# Patient Record
Sex: MALE | Race: WHITE | Employment: OTHER | URBAN - METROPOLITAN AREA
[De-identification: names, ages, dates, MRNs, and addresses within clinical notes are randomized per-mention and may not be internally consistent; named-entity substitution may affect disease eponyms.]

---

## 2017-01-31 ENCOUNTER — TELEPHONE (OUTPATIENT)
Dept: MEDSURG UNIT | Age: 77
End: 2017-01-31

## 2017-01-31 NOTE — TELEPHONE ENCOUNTER
Pt's wife would like to have a  call back from you please. She wants to give you confidential information without pt knowing.  She can be reach a #t [phone]

## 2017-02-16 ENCOUNTER — OFFICE VISIT (OUTPATIENT)
Dept: NEUROLOGY | Age: 77
End: 2017-02-16

## 2017-02-16 VITALS
DIASTOLIC BLOOD PRESSURE: 90 MMHG | SYSTOLIC BLOOD PRESSURE: 132 MMHG | TEMPERATURE: 98 F | HEART RATE: 83 BPM | OXYGEN SATURATION: 95 % | BODY MASS INDEX: 34.54 KG/M2 | WEIGHT: 255 LBS | HEIGHT: 72 IN | RESPIRATION RATE: 16 BRPM

## 2017-02-16 DIAGNOSIS — G30.1 LATE ONSET ALZHEIMER'S DISEASE WITH BEHAVIORAL DISTURBANCE (HCC): Primary | ICD-10-CM

## 2017-02-16 DIAGNOSIS — F02.818 LATE ONSET ALZHEIMER'S DISEASE WITH BEHAVIORAL DISTURBANCE (HCC): Primary | ICD-10-CM

## 2017-02-16 RX ORDER — ESCITALOPRAM OXALATE 10 MG/1
10 TABLET ORAL DAILY
Qty: 30 TAB | Refills: 2 | Status: SHIPPED | OUTPATIENT
Start: 2017-02-16 | End: 2017-05-18 | Stop reason: SDUPTHER

## 2017-02-16 NOTE — MR AVS SNAPSHOT
Visit Information Date & Time Provider Department Dept. Phone Encounter #  
 2/16/2017  3:00 PM Rome Wolfe MD [facility]rology [facility] [phone] [id number] Follow-up Instructions Return in about 8 weeks (around 4/13/2017). Upcoming Health Maintenance Date Due DTaP/Tdap/Td series (1 - Tdap) 10/9/1960 ZOSTER VACCINE AGE 60> 10/9/1999 GLAUCOMA SCREENING Q2Y 10/9/2004 Pneumococcal 65+ Low/Medium Risk (1 of 2 - PCV13) 10/9/2004 MEDICARE YEARLY EXAM 10/9/2004 INFLUENZA AGE 9 TO ADULT 8/1/2016 Allergies as of 2/16/2017  Review Complete On: 2/16/2017 By: Rome Wolfe MD  
  
 Severity Noted Reaction Type Reactions Atenolol  01/06/2016    Unknown (comments) Dilaudid [Hydromorphone]  01/06/2016    Unknown (comments) Lisinopril  01/06/2016    Unknown (comments) Oxycodone  01/06/2016    Unknown (comments) Current Immunizations  Never Reviewed No immunizations on file. Not reviewed this visit Vitals BP Pulse Temp Resp Height(growth percentile) Weight(growth percentile) 132/90 83 98 °F (36.7 °C) 16 6' (1.829 m) 255 lb (115.7 kg) SpO2 BMI Smoking Status 95% 34.58 kg/m2 Former Smoker Vitals History BMI and BSA Data Body Mass Index Body Surface Area 34.58 kg/m 2 2.42 m 2 [facility] [address], [facility] [phone] Your Updated Medication List  
  
   
This list is accurate as of: 2/16/17  4:14 PM.  Always use your most recent med list.  
  
  
  
  
 allopurinol 300 mg tablet Commonly known as:  ZYLOPRIM  
  
 amLODIPine 5 mg tablet Commonly known as:  NORVASC  
  
 cholecalciferol 1,000 unit Cap Commonly known as:  VITAMIN D3 Take  by mouth daily. escitalopram oxalate 10 mg tablet Commonly known as:  Pollyann Hopkins Take 1 Tab by mouth daily. fexofenadine 180 mg tablet Commonly known as:  Eduardo Plenty Take  by mouth. glipiZIDE SR 5 mg CR tablet Commonly known as:  GLUCOTROL XL  
  
 hydroCHLOROthiazide 25 mg tablet Commonly known as:  HYDRODIURIL  
  
 imipramine 25 mg tablet Commonly known as:  TOFRANIL Take 25 mg by mouth three (3) times daily. memantine-donepezil 28-10 mg Cspx Commonly known as:  MOTION PI[employer]URE [facility] Take 1 Cap by mouth daily. metoprolol tartrate 25 mg tablet Commonly known as:  LOPRESSOR  
  
 polyethylene glycol 17 gram packet Commonly known as:  Anastacia Stacey Take 17 g by mouth daily. rivaroxaban 20 mg Tab tablet Commonly known as:  Edmonia Punch Take  by mouth daily. simvastatin 20 mg tablet Commonly known as:  ZOCOR SITagliptin-metFORMIN 50-1,000 mg per tablet Commonly known as:  Tamsen Marty Take 1 Tab by mouth two (2) times daily (with meals). traMADol 50 mg tablet Commonly known as:  ULTRAM  
  
 traMADol-acetaminophen 37.5-325 mg per tablet Commonly known as:  ULTRACET  
2 tabs bid Prescriptions Printed Refills  
 escitalopram oxalate (LEXAPRO) 10 mg tablet 2 Sig: Take 1 Tab by mouth daily. Class: Print Route: Oral  
  
Follow-up Instructions Return in about 8 weeks (around 4/13/2017). Introducing [facility] SERVICES! Shelby Chavez introduces Kii patient portal. Now you can access parts of your medical record, email your doctor's office, and request medication refills online. 1. In your internet browser, go to [email or web address] [email or web address] 2. Click on the First Time User? Click Here link in the Sign In box. You will see the New Member Sign Up page. 3. Enter your Kii Access Code exactly as it appears below. You will not need to use this code after youve completed the sign-up process. If you do not sign up before the expiration date, you must request a new code. · Kii Access Code: [email or web address] Expires: 5/17/2017  3:02 PM 
 
 4. Enter the last four digits of your Social Security Number (xxxx) and Date of Birth (mm/dd/yyyy) as indicated and click Submit. You will be taken to the next sign-up page. 5. Create a FeedMagnet ID. This will be your FeedMagnet login ID and cannot be changed, so think of one that is secure and easy to remember. 6. Create a FeedMagnet password. You can change your password at any time. 7. Enter your Password Reset Question and Answer. This can be used at a later time if you forget your password. 8. Enter your e-mail address. You will receive e-mail notification when new information is available in [address] 9. Click Sign Up. You can now view and download portions of your medical record. 10. Click the Download Summary menu link to download a portable copy of your medical information. If you have questions, please visit the Frequently Asked Questions section of the FeedMagnet website. Remember, FeedMagnet is NOT to be used for urgent needs. For medical emergencies, dial 911. Now available from your iPhone and Android! Please provide this summary of care documentation to your next provider. Your primary care clinician is listed as Greg Bustillos. If you have any questions after today's visit, please call [phone].

## 2017-02-16 NOTE — PROGRESS NOTES
[address] [facility]   [place] [address]   [place], [address]   [phone] [facility]   [phone] Fax               Chief Complaint   Patient presents with    Memory Loss     follow up     Current Outpatient Prescriptions   Medication Sig Dispense Refill    memantine-donepezil (NAMZARIC) 28-10 mg CSpX Take 1 Cap by mouth daily. 30 Cap 1    allopurinol (ZYLOPRIM) 300 mg tablet   0    amLODIPine (NORVASC) 5 mg tablet   0    glipiZIDE SR (GLUCOTROL) 5 mg CR tablet   0    hydrochlorothiazide (HYDRODIURIL) 25 mg tablet   0    metoprolol (LOPRESSOR) 25 mg tablet   0    simvastatin (ZOCOR) 20 mg tablet   0    traMADol-acetaminophen (ULTRACET) 37.5-325 mg per tablet 2 tabs bid  0    traMADol (ULTRAM) 50 mg tablet   0    fexofenadine (ALLEGRA) 180 mg tablet Take  by mouth.  imipramine (TOFRANIL) 25 mg tablet Take 25 mg by mouth three (3) times daily.  sitaGLIPtin-metFORMIN (JANUMET) 50-1,000 mg per tablet Take 1 Tab by mouth two (2) times daily (with meals).  polyethylene glycol (MIRALAX) 17 gram packet Take 17 g by mouth daily.  rivaroxaban (XARELTO) 20 mg tab tablet Take  by mouth daily.  Cholecalciferol, Vitamin D3, 1,000 unit cap Take  by mouth daily. Allergies   Allergen Reactions    Atenolol Unknown (comments)    Dilaudid [Hydromorphone] Unknown (comments)    Lisinopril Unknown (comments)    Oxycodone Unknown (comments)     Social History   Substance Use Topics    Smoking status: Former Smoker    Smokeless tobacco: None    Alcohol use Yes     Patient returns today for follow-up dementia, Alzheimer's type. His wife accompanies him and provides history. She has a typewritten list of issues and most of these relate to behavior. She notes that he argues about the water filter needs to be replaced but it is too expensive. 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   He was not able to handle his responsibilities in terms of leading a group at [employer]. He is sending money office organizations and has overpaid conference fees because he did not understand. He is cursing at times. He is forgetting to check the port slide and if the doors locked. He has become loud and argumentative. Examination  Visit Vitals    /90    Pulse 83    Temp 98 °F (36.7 °C)    Resp 16    Ht 6' (1.829 m)    Wt 115.7 kg (255 lb)    SpO2 95%    BMI 34.58 kg/m2     He is awake alert and oriented. He is quick to anger in the room. He has no icterus. He has no edema. He is awake alert oriented to the correct day and date. Tells me the current president, the past president, and who lost the election as well. Correctly calculates there are 27 nickels in $1.35. He has registration 3/3 and recall 1/3 at 5 minutes even with hints. No pronation or drift. No nystagmus. Steady gait. Impression/Plan  Dementia, Alzheimer's type with agitation. Continue Namzaric. We discussed options today. I had really like to avoid a neuroleptic so at this point we will try a small dose of Lexapro 10 mg daily. We will see him back in 8 weeks and if this is having a positive effect we will continue it. If it is having a positive effect but not complete effect then we will increase it. If it has no effective then we will likely try Risperdal.        This note was created using voice recognition software. Despite editing, there may be syntax errors. This note will not be viewable in [address]     Total time: 30 min   Counseling / coordination time: 20 min   > 50% counseling / coordination?: Yes re: as documented above

## 2017-02-16 NOTE — PROGRESS NOTES
Follow up for memory loss. Spouse reports spouse has become very negative,screams when he doesn't understand things. Has become argumentative. Patient aware of these behaviors. Reports strange dreams and increase in confusion at times.

## 2017-05-08 ENCOUNTER — TELEPHONE (OUTPATIENT)
Dept: NEUROLOGY | Age: 77
End: 2017-05-08

## 2017-05-08 NOTE — TELEPHONE ENCOUNTER
----- Message from Florence Doherty sent at 5/8/2017 10:37 AM EDT -----  Regarding: Dr. Lsia Rosas  Pt stated he is looking into getting a walker he can push and sit down on if he needs to, and would like to know if the doctor could write a Rx for him. Best contact number [phone].

## 2017-05-18 ENCOUNTER — OFFICE VISIT (OUTPATIENT)
Dept: NEUROLOGY | Age: 77
End: 2017-05-18

## 2017-05-18 VITALS
HEIGHT: 72 IN | SYSTOLIC BLOOD PRESSURE: 110 MMHG | WEIGHT: 259 LBS | TEMPERATURE: 98.1 F | HEART RATE: 72 BPM | BODY MASS INDEX: 35.08 KG/M2 | DIASTOLIC BLOOD PRESSURE: 76 MMHG | OXYGEN SATURATION: 95 % | RESPIRATION RATE: 15 BRPM

## 2017-05-18 DIAGNOSIS — F02.818 LATE ONSET ALZHEIMER'S DISEASE WITH BEHAVIORAL DISTURBANCE (HCC): Primary | ICD-10-CM

## 2017-05-18 DIAGNOSIS — G30.1 LATE ONSET ALZHEIMER'S DISEASE WITH BEHAVIORAL DISTURBANCE (HCC): Primary | ICD-10-CM

## 2017-05-18 RX ORDER — ESCITALOPRAM OXALATE 20 MG/1
20 TABLET ORAL DAILY
Qty: 30 TAB | Refills: 2 | Status: SHIPPED | OUTPATIENT
Start: 2017-05-18 | End: 2017-08-14 | Stop reason: SDUPTHER

## 2017-05-18 NOTE — PROGRESS NOTES
[address] [facility]. [address]   [place] [address]   [place]ann Wilson    [facility]   [phone] Fax               Chief Complaint   Patient presents with    Memory Loss     follow up     Current Outpatient Prescriptions   Medication Sig Dispense Refill    escitalopram oxalate (LEXAPRO) 10 mg tablet Take 1 Tab by mouth daily. 30 Tab 2    memantine-donepezil (NAMZARIC) 28-10 mg CSpX Take 1 Cap by mouth daily. 30 Cap 1    allopurinol (ZYLOPRIM) 300 mg tablet   0    amLODIPine (NORVASC) 5 mg tablet   0    glipiZIDE SR (GLUCOTROL) 5 mg CR tablet   0    hydrochlorothiazide (HYDRODIURIL) 25 mg tablet   0    metoprolol (LOPRESSOR) 25 mg tablet   0    simvastatin (ZOCOR) 20 mg tablet   0    traMADol-acetaminophen (ULTRACET) 37.5-325 mg per tablet 2 tabs bid  0    traMADol (ULTRAM) 50 mg tablet   0    fexofenadine (ALLEGRA) 180 mg tablet Take  by mouth.  imipramine (TOFRANIL) 25 mg tablet Take 25 mg by mouth three (3) times daily.  sitaGLIPtin-metFORMIN (JANUMET) 50-1,000 mg per tablet Take 1 Tab by mouth two (2) times daily (with meals).  polyethylene glycol (MIRALAX) 17 gram packet Take 17 g by mouth daily.  rivaroxaban (XARELTO) 20 mg tab tablet Take  by mouth daily.  Cholecalciferol, Vitamin D3, 1,000 unit cap Take  by mouth daily. Allergies   Allergen Reactions    Atenolol Unknown (comments)    Dilaudid [Hydromorphone] Unknown (comments)    Lisinopril Unknown (comments)    Oxycodone Unknown (comments)     Social History   Substance Use Topics    Smoking status: Former Smoker    Smokeless tobacco: None    Alcohol use Yes     Patient returns today for follow-up Alzheimer's dementia with agitation. Last visit we continued his memory modifying medications however we also added some Lexapro to see if that helped with the agitation. Since then he thinks his agitation has been better.   He think his mood has been a bit more stable. His wife has noticed some improvement as well. She still has to redirect at times. He is tolerating medicine without difficulty. No aggression although she does say that he will get frustrated still very easily and swear. They just got back from a cruise to [place]. Examination  Visit Vitals    Resp 15    Ht 6' (1.829 m)    Wt 117.5 kg (259 lb)    BMI 35.13 kg/m2     He looks well. Probably dressed and groomed. Discusses the cruise to [place]. Discusses current events per    Impression/Plan  Agitation and dementia as stated. Continue with our memory modifying medications as is. Increase Lexapro to 20 mg daily. Discussed potential side effects, potential benefits, and potential alternatives. Would like to avoid neuroleptics. Follow in 2 months and see how is doing on this higher dose. Also discussed redirection and picking battles. This note was created using voice recognition software. Despite editing, there may be syntax errors. This note will not be viewable in [address]

## 2017-05-18 NOTE — PROGRESS NOTES
Follow up for memory loss. No significant changes in memory since last visit. Spouse and patient reports increase in unsteady gait and increase in hand tremors. Spouse reports diarrhea and increase in fatigue since starting namzaric.

## 2017-05-18 NOTE — PATIENT INSTRUCTIONS
Information Regarding Testing     If you have physican order for a test or a medication denied by your insurance company, this does not mean the test or medication is not appropriate for you as that is a medical decision, not a decision to be made by an insurance company representative or by an [facility] physician who has not interviewed and examined you. This is a decision to be made between you and your physician. The denial of services is a contractual matter between you and your insurance company, not an issue between your physician and the insurance company. If your test or medication is denied, you can take the following steps to help resolve the issue:    1. File a complaint with the [facility] regarding your insurance company's denial of services ordered for you. You can do this either by calling them directly or by completing an on-line complaint form on the [employer]. This can be found at www.Node1    2. Also file a formal complaint with your insurance company and ask to have the name of the person denying the service so that you may explore a legal option should you be harmed by this denial of service. Again, the fact the insurance company will not pay for the service does not mean it is not medically necessary and I would encourage you to follow through with the plan that was made with your physician    3. File a written complaint with your employer so your employer and benefit manager is aware of the poor coverage they are providing their employees. If you have medicare/medicaid, complain to your representative in the House and to your Lu Aguilera.         [facility]   Statement to Patients  April 1, 2014      In an effort to ensure the large volume of patient prescription refills is processed in the most efficient and expeditious manner, we are asking our patients to assist us by calling your Pharmacy for all prescription refills, this will include also your  Mail Order Pharmacy. The pharmacy will contact our office electronically to continue the refill process. Please do not wait until the last minute to call your pharmacy. We need at least 48 hours (2days) to fill prescriptions. We also encourage you to call your pharmacy before going to  your prescription to make sure it is ready. With regard to controlled substance prescription refill requests (narcotic refills) that need to be picked up at our office, we ask your cooperation by providing us with at least 72 hours (3days) notice that you will need a refill. We will not refill narcotic prescription refill requests after 4:00pm on any weekday, Monday through Thursday, or after 2:00pm on Fridays, or on the weekends. We encourage everyone to explore another way of getting your prescription refill request processed using [employer], our patient web portal through our electronic medical record system. [employer] is an efficient and effective way to communicate your medication request directly to the office and  downloadable as an francisco on your smart phone . [employer] also features a review functionality that allows you to view your medication list as well as leave messages for your physician. Are you ready to get connected? If so please review the attatched instructions or speak to any of our staff to get you set up right away! Thank you so much for your cooperation. Should you have any questions please contact our Practice Administrator. The Physicians and Staff,  [facility]       If we have ordered testing for you, we do not call patients with results and we do not give test results over the phone. We schedule follow up appointments so that your results can be discussed in person and any questions you have regarding them may be addressed.   If something of concern is revealed on your test, we will call you for a sooner follow up appointment. Additionally, results may be found by using the My Chart feature and one of our patient service representatives at the  can give you instructions on how to access this feature of our electronic medical record system. Learning About Moises Ceja  What is a living will? A living will is a legal form you use to write down the kind of care you want at the end of your life. It is used by the health professionals who will treat you if you aren't able to decide for yourself. If you put your wishes in writing, your loved ones and others will know what kind of care you want. They won't need to guess. This can ease your mind and be helpful to others. A living will is not the same as an estate or property will. An estate will explains what you want to happen with your money and property after you die. Is a living will a legal document? A living will is a legal document. Each state has its own laws about living ordaz. If you move to another state, make sure that your living will is legal in the state where you now live. Or you might use a universal form that has been approved by many states. This kind of form can sometimes be completed and stored online. Your electronic copy will then be available wherever you have a connection to the Internet. In most cases, doctors will respect your wishes even if you have a form from a different state. · You don't need an  to complete a living will. But legal advice can be helpful if your state's laws are unclear, your health history is complicated, or your family can't agree on what should be in your living will. · You can change your living will at any time. Some people find that their wishes about end-of-life care change as their health changes. · In addition to making a living will, think about completing a medical power of  form.  This form lets you name the person you want to make end-of-life treatment decisions for you (your \"health care agent\") if you're not able to. Many hospitals and nursing homes will give you the forms you need to complete a living will and a medical power of . · Your living will is used only if you can't make or communicate decisions for yourself anymore. If you become able to make decisions again, you can accept or refuse any treatment, no matter what you wrote in your living will. · Your state may offer an online registry. This is a place where you can store your living will online so the doctors and nurses who need to treat you can find it right away. What should you think about when creating a living will? Talk about your end-of-life wishes with your family members and your doctor. Let them know what you want. That way the people making decisions for you won't be surprised by your choices. Think about these questions as you make your living will:  · Do you know enough about life support methods that might be used? If not, talk to your doctor so you know what might be done if you can't breathe on your own, your heart stops, or you're unable to swallow. · What things would you still want to be able to do after you receive life-support methods? Would you want to be able to walk? To speak? To eat on your own? To live without the help of machines? · If you have a choice, where do you want to be cared for? In your home? At a hospital or nursing home? · Do you want certain Zoroastrianism practices performed if you become very ill? · If you have a choice at the end of your life, where would you prefer to die? At home? In a hospital or nursing home? Somewhere else? · Would you prefer to be buried or cremated? · Do you want your organs to be donated after you die? What should you do with your living will? · Make sure that your family members and your health care agent have copies of your living will. · Give your doctor a copy of your living will to keep in your medical record.  If you have more than one doctor, make sure that each one has a copy. · You may want to put a copy of your living will where it can be easily found. Where can you learn more? Go to http://saira-gogo.info/. Enter K039 in the search box to learn more about \"Learning About Living Miguel Su. \"  Current as of: February 24, 2016  Content Version: 11.2  © 1290-4136 [employer]. Care instructions adapted under license by [employer] (which disclaims liability or warranty for this information). If you have questions about a medical condition or this instruction, always ask your healthcare professional. [address] any warranty or liability for your use of this information.

## 2017-05-18 NOTE — MR AVS SNAPSHOT
Visit Information Date & Time Provider Department Dept. Phone Encounter #  
 5/18/2017  3:20 PM Saadia Luo MD Neurology [phone] Follow-up Instructions Return in about 2 weeks (around 6/1/2017). Upcoming Health Maintenance Date Due DTaP/Tdap/Td series (1 - Tdap) 10/9/1960 ZOSTER VACCINE AGE 60> 10/9/1999 GLAUCOMA SCREENING Q2Y 10/9/2004 Pneumococcal 65+ Low/Medium Risk (1 of 2 - PCV13) 10/9/2004 MEDICARE YEARLY EXAM 10/9/2004 INFLUENZA AGE 9 TO ADULT 8/1/2017 Allergies as of 5/18/2017  Review Complete On: 5/18/2017 By: Saadia Luo MD  
  
 Severity Noted Reaction Type Reactions Atenolol  01/06/2016    Unknown (comments) Dilaudid [Hydromorphone]  01/06/2016    Unknown (comments) Lisinopril  01/06/2016    Unknown (comments) Oxycodone  01/06/2016    Unknown (comments) Current Immunizations  Never Reviewed No immunizations on file. Not reviewed this visit Vitals BP Pulse Temp Resp Height(growth percentile) Weight(growth percentile) 110/76 72 98.1 °F (36.7 °C) 15 6' (1.829 m) 259 lb (117.5 kg) SpO2 BMI Smoking Status 95% 35.13 kg/m2 Former Smoker Vitals History BMI and BSA Data Body Mass Index Body Surface Area  
 35.13 kg/m 2 2.44 m 2 Preferred Pharmacy Pharmacy Name Phone [address] [place] [address] [phone] Your Updated Medication List  
  
   
This list is accurate as of: 5/18/17  3:37 PM.  Always use your most recent med list.  
  
  
  
  
 allopurinol 300 mg tablet Commonly known as:  ZYLOPRIM  
  
 amLODIPine 5 mg tablet Commonly known as:  NORVASC  
  
 cholecalciferol 1,000 unit Cap Commonly known as:  VITAMIN D3 Take  by mouth daily. escitalopram oxalate 20 mg tablet Commonly known as:  Vernestine Fiddler Take 1 Tab by mouth daily. fexofenadine 180 mg tablet Commonly known as:  Ania Coats Take  by mouth. glipiZIDE SR 5 mg CR tablet Commonly known as:  GLUCOTROL XL  
  
 hydroCHLOROthiazide 25 mg tablet Commonly known as:  HYDRODIURIL  
  
 imipramine 25 mg tablet Commonly known as:  TOFRANIL Take 25 mg by mouth three (3) times daily. memantine-donepezil 28-10 mg Cspx Commonly known as:  MOTION [facility] Take 1 Cap by mouth daily. metoprolol tartrate 25 mg tablet Commonly known as:  LOPRESSOR  
  
 polyethylene glycol 17 gram packet Commonly known as:  Izabella [place] Take 17 g by mouth daily. rivaroxaban 20 mg Tab tablet Commonly known as:  Sanford Kylah Take  by mouth daily. simvastatin 20 mg tablet Commonly known as:  ZOCOR SITagliptin-metFORMIN 50-1,000 mg per tablet Commonly known as:  Pooja Spray Take 1 Tab by mouth two (2) times daily (with meals). traMADol 50 mg tablet Commonly known as:  ULTRAM  
  
 traMADol-acetaminophen 37.5-325 mg per tablet Commonly known as:  ULTRACET  
2 tabs bid Prescriptions Printed Refills  
 escitalopram oxalate (LEXAPRO) 20 mg tablet 2 Sig: Take 1 Tab by mouth daily. Class: Print Route: Oral  
  
Follow-up Instructions Return in about 2 weeks (around 6/1/2017). Patient Instructions Information Regarding Testing If you have physican order for a test or a medication denied by your insurance company, this does not mean the test or medication is not appropriate for you as that is a medical decision, not a decision to be made by an insurance company representative or by an [facility] physician who has not interviewed and examined you. This is a decision to be made between you and your physician. The denial of services is a contractual matter between you and your insurance company, not an issue between your physician and the insurance company. If your test or medication is denied, you can take the following steps to help resolve the issue: 1.  File a complaint with the [facility] of Insurance regarding your insurance company's denial of services ordered for you. You can do this either by calling them directly or by completing an on-line complaint form on the [employer]. This can be found at [email or web address].[email or web address] 2. Also file a formal complaint with your insurance company and ask to have the name of the person denying the service so that you may explore a legal option should you be harmed by this denial of service. Again, the fact the insurance company will not pay for the service does not mean it is not medically necessary and I would encourage you to follow through with the plan that was made with your physician 3. File a written complaint with your employer so your employer and benefit manager is aware of the poor coverage they are providing their employees. If you have medicare/medicaid, complain to your representative in the House and to your Lu Aguilera. PRESCRIPTION REFILL POLICY [facility] Statement to Patients April 1, 2014 In an effort to ensure the large volume of patient prescription refills is processed in the most efficient and expeditious manner, we are asking our patients to assist us by calling your Pharmacy for all prescription refills, this will include also your  Mail Order Pharmacy. The pharmacy will contact our office electronically to continue the refill process. Please do not wait until the last minute to call your pharmacy. We need at least 48 hours (2days) to fill prescriptions. We also encourage you to call your pharmacy before going to  your prescription to make sure it is ready. With regard to controlled substance prescription refill requests (narcotic refills) that need to be picked up at our office, we ask your cooperation by providing us with at least 72 hours (3days) notice that you will need a refill. We will not refill narcotic prescription refill requests after 4:00pm on any weekday, Monday through Thursday, or after 2:00pm on Fridays, or on the weekends. We encourage everyone to explore another way of getting your prescription refill request processed using Eyestorm, our patient web portal through our electronic medical record system. Eyestorm is an efficient and effective way to communicate your medication request directly to the office and  downloadable as an francisco on your smart phone . Eyestorm also features a review functionality that allows you to view your medication list as well as leave messages for your physician. Are you ready to get connected? If so please review the attatched instructions or speak to any of our staff to get you set up right away! Thank you so much for your cooperation. Should you have any questions please contact our Practice Administrator. The Physicians and Staff,  [facility] If we have ordered testing for you, we do not call patients with results and we do not give test results over the phone. We schedule follow up appointments so that your results can be discussed in person and any questions you have regarding them may be addressed. If something of concern is revealed on your test, we will call you for a sooner follow up appointment. Additionally, results may be found by using the My Chart feature and one of our patient service representatives at the  can give you instructions on how to access this feature of our electronic medical record system. Sarah Welsh 1721 What is a living will? A living will is a legal form you use to write down the kind of care you want at the end of your life. It is used by the health professionals who will treat you if you aren't able to decide for yourself.  
If you put your wishes in writing, your loved ones and others will know what kind of care you want. They won't need to guess. This can ease your mind and be helpful to others. A living will is not the same as an estate or property will. An estate will explains what you want to happen with your money and property after you die. Is a living will a legal document? A living will is a legal document. Each state has its own laws about living ordaz. If you move to another state, make sure that your living will is legal in the state where you now live. Or you might use a universal form that has been approved by many states. This kind of form can sometimes be completed and stored online. Your electronic copy will then be available wherever you have a connection to the Internet. In most cases, doctors will respect your wishes even if you have a form from a different state. · You don't need an  to complete a living will. But legal advice can be helpful if your state's laws are unclear, your health history is complicated, or your family can't agree on what should be in your living will. · You can change your living will at any time. Some people find that their wishes about end-of-life care change as their health changes. · In addition to making a living will, think about completing a medical power of  form. This form lets you name the person you want to make end-of-life treatment decisions for you (your \"health care agent\") if you're not able to. Many hospitals and nursing homes will give you the forms you need to complete a living will and a medical power of . · Your living will is used only if you can't make or communicate decisions for yourself anymore. If you become able to make decisions again, you can accept or refuse any treatment, no matter what you wrote in your living will. · Your state may offer an online registry. This is a place where you can store your living will online so the doctors and nurses who need to treat you can find it right away. What should you think about when creating a living will? Talk about your end-of-life wishes with your family members and your doctor. Let them know what you want. That way the people making decisions for you won't be surprised by your choices. Think about these questions as you make your living will: · Do you know enough about life support methods that might be used? If not, talk to your doctor so you know what might be done if you can't breathe on your own, your heart stops, or you're unable to swallow. · What things would you still want to be able to do after you receive life-support methods? Would you want to be able to walk? To speak? To eat on your own? To live without the help of machines? · If you have a choice, where do you want to be cared for? In your home? At a hospital or nursing home? · Do you want certain Pent[employer] practices performed if you become very ill? · If you have a choice at the end of your life, where would you prefer to die? At home? In a hospital or nursing home? Somewhere else? · Would you prefer to be buried or cremated? · Do you want your organs to be donated after you die? What should you do with your living will? · Make sure that your family members and your health care agent have copies of your living will. · Give your doctor a copy of your living will to keep in your medical record. If you have more than one doctor, make sure that each one has a copy. · You may want to put a copy of your living will where it can be easily found. Where can you learn more? Go to http://saira-gogo.info/. Enter U863 in the search box to learn more about \"Learning About Living Silvia Buckley. \" Current as of: February 24, 2016 Content Version: 11.2 © 4214-7103 [employer]. Care instructions adapted under license by [employer] (which disclaims liability or warranty for this information).  If you have questions about a medical condition or this instruction, always ask your healthcare professional. [place] [address] any warranty or liability for your use of this information. Introducing [facility]! Chyna Jimenez introduces dotSyntax patient portal. Now you can access parts of your medical record, email your doctor's office, and request medication refills online. 1. In your internet browser, go to [email or web address] 2. Click on the First Time User? Click Here link in the Sign In box. You will see the New Member Sign Up page. 3. Enter your dotSyntax Access Code exactly as it appears below. You will not need to use this code after youve completed the sign-up process. If you do not sign up before the expiration date, you must request a new code. · dotSyntax Access Code: [email or web address] Expires: 8/16/2017  3:34 PM 
 
4. Enter the last four digits of your Social Security Number (xxxx) and Date of Birth (mm/dd/yyyy) as indicated and click Submit. You will be taken to the next sign-up page. 5. Create a dotSyntax ID. This will be your dotSyntax login ID and cannot be changed, so think of one that is secure and easy to remember. 6. Create a dotSyntax password. You can change your password at any time. 7. Enter your Password Reset Question and Answer. This can be used at a later time if you forget your password. 8. Enter your e-mail address. You will receive e-mail notification when new information is available in [address] 9. Click Sign Up. You can now view and download portions of your medical record. 10. Click the Download Summary menu link to download a portable copy of your medical information. If you have questions, please visit the Frequently Asked Questions section of the dotSyntax website. Remember, dotSyntax is NOT to be used for urgent needs. For medical emergencies, dial 911. Now available from your iPhone and Android! Please provide this summary of care documentation to your next provider. Your primary care clinician is listed as [address]. If you have any questions after today's visit, please call [phone].

## 2017-08-11 NOTE — PERIOP NOTES
[address]  Endoscopy Preprocedure Instructions      1. On the day of your surgery, please report to registration located on the 2nd floor of the  [facility]. yes    2. You must have a responsible adult to drive you to the hospital, stay at the hospital during your procedure and drive you home. If they leave your procedure will not be started (no exceptions). yes    3. Do not have anything to eat or drink (including water, gum, mints, coffee, and juice) after midnight. This does not apply to the medications you were instructed to take by your physician. yesIf you are currently taking Plavix, Coumadin, Aspirin, or other blood-thinning agents, contact your physician for special instructions. Yes,Xeralto. 4. If you are having a procedure that requires bowel prep: We recommend that you have only clear liquids the day before your procedure and begin your bowel prep by 5:00 pm.  You may continue to drink clear liquids until midnight. If for any reason you are not able to complete your prep please notify your physician immediately. yes    5. Have a list of all current medications, including vitamins, herbal supplements and any other over the counter medications. yes    6. If you wear glasses, contacts, dentures and/or hearing aids, they may be removed prior to procedure, please bring a case to store them in. Yes. Hearing aids    7. You should understand that if you do not follow these instructions your procedure may be cancelled. If your physical condition changes (I.e. fever, cold or flu) please contact your doctor as soon as possible. 8. It is important that you be on time.   If for any reason you are unable to keep your appointment please call )- the day of or your physicians office prior to your scheduled procedure

## 2017-08-14 ENCOUNTER — OFFICE VISIT (OUTPATIENT)
Dept: NEUROLOGY | Age: 77
End: 2017-08-14

## 2017-08-14 VITALS
WEIGHT: 261 LBS | HEART RATE: 59 BPM | TEMPERATURE: 98 F | RESPIRATION RATE: 18 BRPM | OXYGEN SATURATION: 96 % | BODY MASS INDEX: 35.35 KG/M2 | DIASTOLIC BLOOD PRESSURE: 90 MMHG | SYSTOLIC BLOOD PRESSURE: 128 MMHG | HEIGHT: 72 IN

## 2017-08-14 DIAGNOSIS — R26.9 GAIT ABNORMALITY: Primary | ICD-10-CM

## 2017-08-14 DIAGNOSIS — G30.1 LATE ONSET ALZHEIMER'S DISEASE WITH BEHAVIORAL DISTURBANCE (HCC): ICD-10-CM

## 2017-08-14 DIAGNOSIS — F02.818 LATE ONSET ALZHEIMER'S DISEASE WITH BEHAVIORAL DISTURBANCE (HCC): ICD-10-CM

## 2017-08-14 RX ORDER — RISPERIDONE 0.5 MG/1
0.5 TABLET, FILM COATED ORAL
Qty: 30 TAB | Refills: 3 | Status: SHIPPED | OUTPATIENT
Start: 2017-08-14 | End: 2017-10-17 | Stop reason: SDUPTHER

## 2017-08-14 RX ORDER — ESCITALOPRAM OXALATE 20 MG/1
20 TABLET ORAL DAILY
Qty: 90 TAB | Refills: 3 | Status: SHIPPED | OUTPATIENT
Start: 2017-08-14 | End: 2017-10-17

## 2017-08-14 NOTE — PROGRESS NOTES
Follow up for memory loss. Spouse reports decline in short term since last visit. Spouse has letter with multiple complaints. See media for letter.

## 2017-08-14 NOTE — MR AVS SNAPSHOT
Visit Information Date & Time Provider Department Dept. Phone Encounter #  
 8/14/2017  3:20 PM Ramesh Tobin MD [facility] [phone] [id number] Follow-up Instructions Return in about 8 weeks (around 10/9/2017). Upcoming Health Maintenance Date Due DTaP/Tdap/Td series (1 - Tdap) 10/9/1960 ZOSTER VACCINE AGE 60> 8/9/1999 GLAUCOMA SCREENING Q2Y 10/9/2004 Pneumococcal 65+ Low/Medium Risk (1 of 2 - PCV13) 10/9/2004 MEDICARE YEARLY EXAM 10/9/2004 INFLUENZA AGE 9 TO ADULT 8/1/2017 Allergies as of 8/14/2017  Review Complete On: 8/14/2017 By: Ramesh Tobin MD  
  
 Severity Noted Reaction Type Reactions Atenolol  01/06/2016    Unknown (comments) Dilaudid [Hydromorphone]  01/06/2016    Unknown (comments) Lisinopril  01/06/2016    Unknown (comments) Oxycodone  01/06/2016    Unknown (comments) Current Immunizations  Never Reviewed No immunizations on file. Not reviewed this visit You Were Diagnosed With   
  
 Codes Comments Gait abnormality    -  Primary ICD-10-CM: R26.9 ICD-9-CM: 336. 2 Vitals BP Pulse Temp Resp Height(growth percentile) Weight(growth percentile) 128/90 (!) 59 98 °F (36.7 °C) 18 6' (1.829 m) 261 lb (118.4 kg) SpO2 BMI Smoking Status 96% 35.4 kg/m2 Former Smoker Vitals History BMI and BSA Data Body Mass Index Body Surface Area  
 35.4 kg/m 2 2.45 m 2 Preferred Pharmacy Pharmacy Name Phone [address] [facility] [phone] Your Updated Medication List  
  
   
This list is accurate as of: 8/14/17  4:22 PM.  Always use your most recent med list.  
  
  
  
  
 allopurinol 300 mg tablet Commonly known as:  ZYLOPRIM  
  
 amLODIPine 5 mg tablet Commonly known as:  NORVASC  
  
 cholecalciferol 1,000 unit Cap Commonly known as:  VITAMIN D3 Take  by mouth daily. escitalopram oxalate 20 mg tablet Commonly known as:  Christiano Shine Take 1 Tab by mouth daily. fexofenadine 180 mg tablet Commonly known as:  Awais Juan Carlos Take  by mouth. glipiZIDE SR 5 mg CR tablet Commonly known as:  GLUCOTROL XL  
  
 hydroCHLOROthiazide 25 mg tablet Commonly known as:  HYDRODIURIL  
  
 imipramine 25 mg tablet Commonly known as:  TOFRANIL Take 25 mg by mouth three (3) times daily. memantine-donepezil 28-10 mg Cspx Commonly known as:  [facility] Take 1 Cap by mouth daily. metoprolol tartrate 25 mg tablet Commonly known as:  LOPRESSOR  
  
 risperiDONE 0.5 mg tablet Commonly known as:  RisperDAL Take 1 Tab by mouth nightly. rivaroxaban 20 mg Tab tablet Commonly known as:  Ramses Lanius Take  by mouth daily. simvastatin 20 mg tablet Commonly known as:  ZOCOR SITagliptin-metFORMIN 50-1,000 mg per tablet Commonly known as:  Aren Ba Take 1 Tab by mouth two (2) times daily (with meals). Prescriptions Printed Refills  
 risperiDONE (RISPERDAL) 0.5 mg tablet 3 Sig: Take 1 Tab by mouth nightly. Class: Print Route: Oral  
  
Prescriptions Sent to Pharmacy Refills  
 escitalopram oxalate (LEXAPRO) 20 mg tablet 3 Sig: Take 1 Tab by mouth daily. Class: Normal  
 Pharmacy: [address], [address] Ph #: [phone] Route: Oral  
  
We Performed the Following REFERRAL TO PHYSICAL THERAPY [UKS98 Custom] Comments:  
 Please evaluate patient for In Motion gait disturbance/debil eval and treat and give HEP Follow-up Instructions Return in about 8 weeks (around 10/9/2017). Referral Information Referral ID Referred By Referred To  
  
 [id number] MACO ALMARAZ Not Available Visits Status Start Date End Date 1 New Request 8/14/17 8/14/18  If your referral has a status of pending review or denied, additional information will be sent to support the outcome of this decision. Patient Instructions Information Regarding Testing If you have physican order for a test or a medication denied by your insurance company, this does not mean the test or medication is not appropriate for you as that is a medical decision, not a decision to be made by an insurance company representative or by an [facility] physician who has not interviewed and examined you. This is a decision to be made between you and your physician. The denial of services is a contractual matter between you and your insurance company, not an issue between your physician and the insurance company. If your test or medication is denied, you can take the following steps to help resolve the issue: 1. File a complaint with the [facility] regarding your insurance company's denial of services ordered for you. You can do this either by calling them directly or by completing an on-line complaint form on the [place]. This can be found at [email or web address] 2. Also file a formal complaint with your insurance company and ask to have the name of the person denying the service so that you may explore a legal option should you be harmed by this denial of service. Again, the fact the insurance company will not pay for the service does not mean it is not medically necessary and I would encourage you to follow through with the plan that was made with your physician 3. File a written complaint with your employer so your employer and benefit manager is aware of the poor coverage they are providing their employees. If you have medicare/medicaid, complain to your representative in the House and to your Lu Aguilera. PRESCRIPTION REFILL POLICY [facility] Statement to Patients April 1, 2014 In an effort to ensure the large volume of patient prescription refills is processed in the most efficient and expeditious manner, we are asking our patients to assist us by calling your Pharmacy for all prescription refills, this will include also your  Mail Order Pharmacy. The pharmacy will contact our office electronically to continue the refill process. Please do not wait until the last minute to call your pharmacy. We need at least 48 hours (2days) to fill prescriptions. We also encourage you to call your pharmacy before going to  your prescription to make sure it is ready. With regard to controlled substance prescription refill requests (narcotic refills) that need to be picked up at our office, we ask your cooperation by providing us with at least 72 hours (3days) notice that you will need a refill. We will not refill narcotic prescription refill requests after 4:00pm on any weekday, Monday through Thursday, or after 2:00pm on Fridays, or on the weekends. We encourage everyone to explore another way of getting your prescription refill request processed using HiConversion.ru, our patient web portal through our electronic medical record system. HiConversion[employer]ru is an efficient and effective way to communicate your medication request directly to the office and  downloadable as an francisco on your smart phone . HiConversion[employer]ru also features a review functionality that allows you to view your medication list as well as leave messages for your physician. Are you ready to get connected? If so please review the attatched instructions or speak to any of our staff to get you set up right away! Thank you so much for your cooperation. Should you have any questions please contact our Practice Administrator. The Physicians and Staff,  [facility] If we have ordered testing for you, we do not call patients with results and we do not give test results over the phone.   We schedule follow up appointments so that your results can be discussed in person and any questions you have regarding them may be addressed. If something of concern is revealed on your test, we will call you for a sooner follow up appointment. Additionally, results may be found by using the My Chart feature and one of our patient service representatives at the  can give you instructions on how to access this feature of our electronic medical record system. Sarah Welsh 1721 What is a living will? A living will is a legal form you use to write down the kind of care you want at the end of your life. It is used by the health professionals who will treat you if you aren't able to decide for yourself. If you put your wishes in writing, your loved ones and others will know what kind of care you want. They won't need to guess. This can ease your mind and be helpful to others. A living will is not the same as an estate or property will. An estate will explains what you want to happen with your money and property after you die. Is a living will a legal document? A living will is a legal document. Each state has its own laws about living ordaz. If you move to another state, make sure that your living will is legal in the state where you now live. Or you might use a universal form that has been approved by many states. This kind of form can sometimes be completed and stored online. Your electronic copy will then be available wherever you have a connection to the Internet. In most cases, doctors will respect your wishes even if you have a form from a different state. · You don't need an  to complete a living will. But legal advice can be helpful if your state's laws are unclear, your health history is complicated, or your family can't agree on what should be in your living will. · You can change your living will at any time. Some people find that their wishes about end-of-life care change as their health changes. · In addition to making a living will, think about completing a medical power of  form. This form lets you name the person you want to make end-of-life treatment decisions for you (your \"health care agent\") if you're not able to. Many hospitals and nursing homes will give you the forms you need to complete a living will and a medical power of . · Your living will is used only if you can't make or communicate decisions for yourself anymore. If you become able to make decisions again, you can accept or refuse any treatment, no matter what you wrote in your living will. · Your state may offer an online registry. This is a place where you can store your living will online so the doctors and nurses who need to treat you can find it right away. What should you think about when creating a living will? Talk about your end-of-life wishes with your family members and your doctor. Let them know what you want. That way the people making decisions for you won't be surprised by your choices. Think about these questions as you make your living will: · Do you know enough about life support methods that might be used? If not, talk to your doctor so you know what might be done if you can't breathe on your own, your heart stops, or you're unable to swallow. · What things would you still want to be able to do after you receive life-support methods? Would you want to be able to walk? To speak? To eat on your own? To live without the help of machines? · If you have a choice, where do you want to be cared for? In your home? At a hospital or nursing home? · Do you want certain Anabaptist practices performed if you become very ill? · If you have a choice at the end of your life, where would you prefer to die? At home? In a hospital or nursing home? Somewhere else? · Would you prefer to be buried or cremated? · Do you want your organs to be donated after you die? What should you do with your living will? · Make sure that your family members and your health care agent have copies of your living will. · Give your doctor a copy of your living will to keep in your medical record. If you have more than one doctor, make sure that each one has a copy. · You may want to put a copy of your living will where it can be easily found. Where can you learn more? Go to http://saira-gogo.info/. Enter V371 in the search box to learn more about \"Learning About Living [place]. \" Current as of: August 8, 2016 Content Version: 11.3 © 3831-7905 [employer]. Care instructions adapted under license by [employer] (which disclaims liability or warranty for this information). If you have questions about a medical condition or this instruction, always ask your healthcare professional. [address] any warranty or liability for your use of this information. Introducing \[facility]\"" & HEALTH SERVICES! Domo Marino introduces Swoopo patient portal. Now you can access parts of your medical record, email your doctor's office, and request medication refills online. 1. In your internet browser, go to [email or web address] 2. Click on the First Time User? Click Here link in the Sign In box. You will see the New Member Sign Up page. 3. Enter your Swoopo Access Code exactly as it appears below. You will not need to use this code after youve completed the sign-up process. If you do not sign up before the expiration date, you must request a new code. · Swoopo Access Code: [email or web address] Expires: 8/16/2017  3:34 PM 
 
4. Enter the last four digits of your Social Security Number (xxxx) and Date of Birth (mm/dd/yyyy) as indicated and click Submit. You will be taken to the next sign-up page. 5. Create a Swoopo ID. This will be your Swoopo login ID and cannot be changed, so think of one that is secure and easy to remember. 6. Create a TranSwitch password. You can change your password at any time. 7. Enter your Password Reset Question and Answer. This can be used at a later time if you forget your password. 8. Enter your e-mail address. You will receive e-mail notification when new information is available in [address] 9. Click Sign Up. You can now view and download portions of your medical record. 10. Click the Download Summary menu link to download a portable copy of your medical information. If you have questions, please visit the Frequently Asked Questions section of the TranSwitch website. Remember, TranSwitch is NOT to be used for urgent needs. For medical emergencies, dial 911. Now available from your iPhone and Android! Please provide this summary of care documentation to your next provider. Your primary care clinician is listed as [address]. If you have any questions after today's visit, please call [phone].

## 2017-08-14 NOTE — PROGRESS NOTES
[address] [facility] [address]   [address]   [address]    [facility]   [phone] Fax               Chief Complaint   Patient presents with    Memory Loss     follow up     Current Outpatient Prescriptions   Medication Sig Dispense Refill    escitalopram oxalate (LEXAPRO) 20 mg tablet Take 1 Tab by mouth daily. 30 Tab 2    memantine-donepezil (NAMZARIC) 28-10 mg CSpX Take 1 Cap by mouth daily. 30 Cap 1    allopurinol (ZYLOPRIM) 300 mg tablet   0    amLODIPine (NORVASC) 5 mg tablet   0    glipiZIDE SR (GLUCOTROL) 5 mg CR tablet   0    hydrochlorothiazide (HYDRODIURIL) 25 mg tablet   0    metoprolol (LOPRESSOR) 25 mg tablet   0    simvastatin (ZOCOR) 20 mg tablet   0    fexofenadine (ALLEGRA) 180 mg tablet Take  by mouth.  imipramine (TOFRANIL) 25 mg tablet Take 25 mg by mouth three (3) times daily.  sitaGLIPtin-metFORMIN (JANUMET) 50-1,000 mg per tablet Take 1 Tab by mouth two (2) times daily (with meals).  rivaroxaban (XARELTO) 20 mg tab tablet Take  by mouth daily.  Cholecalciferol, Vitamin D3, 1,000 unit cap Take  by mouth daily. Allergies   Allergen Reactions    Atenolol Unknown (comments)    Dilaudid [Hydromorphone] Unknown (comments)    Lisinopril Unknown (comments)    Oxycodone Unknown (comments)     Social History   Substance Use Topics    Smoking status: Former Smoker    Smokeless tobacco: Former User     Quit date: 1968    Alcohol use Yes   Patient returns today accompanied by his wife for follow-up dementia, Alzheimer's type with agitation. Last visit we increased his Lexapro to 20 mg. His wife has a typewritten list of questions today and concerns and reports and to view this you can look in the media section of the electronic medical record. All this is consistent with his dementia. He has difficulty with any changes in plans. He has difficulty with his hygiene. He is incontinent.   He [place] his underwear [place]    He is maintained on Namzaric for memory modification medication. He is tolerating that without difficulty. He is compliant with his medications. Examination  Visit Vitals    /90    Pulse (!) 59    Temp 98 °F (36.7 °C)    Resp 18    Ht 6' (1.829 m)    Wt 118.4 kg (261 lb)    SpO2 96%    BMI 35.4 kg/m2     He is awake and alert. He is a bit cantankerous. He gets a little argumentative with his wife today. He follows all commands. Uses his rollater walker    Impression/Plan  Alzheimer's type dementia with some behavioral disturbance as noted above. Discussed this today at length with the patient and his wife. Continue with Namzaric. Continue with Lexapro. Add Risperdal 0.5 mg at bedtime. Discussed administration, potential side effects, and potential benefits. Follow-up in 8 weeks and make adjustments as needed. Total time: 25 min   Counseling / coordination time: 15 min   > 50% counseling / coordination?: Yes re: as documented above        This note was created using voice recognition software. Despite editing, there may be syntax errors. This note will not be viewable in [address]

## 2017-08-14 NOTE — PATIENT INSTRUCTIONS
Information Regarding Testing     If you have physican order for a test or a medication denied by your insurance company, this does not mean the test or medication is not appropriate for you as that is a medical decision, not a decision to be made by an insurance company representative or by an [facility] physician who has not interviewed and examined you. This is a decision to be made between you and your physician. The denial of services is a contractual matter between you and your insurance company, not an issue between your physician and the insurance company. If your test or medication is denied, you can take the following steps to help resolve the issue:    1. File a complaint with the [facility] regarding your insurance company's denial of services ordered for you. You can do this either by calling them directly or by completing an on-line complaint form on the [employer]. This can be found at [email or web address]    2. Also file a formal complaint with your insurance company and ask to have the name of the person denying the service so that you may explore a legal option should you be harmed by this denial of service. Again, the fact the insurance company will not pay for the service does not mean it is not medically necessary and I would encourage you to follow through with the plan that was made with your physician    3. File a written complaint with your employer so your employer and benefit manager is aware of the poor coverage they are providing their employees. If you have medicare/medicaid, complain to your representative in the House and to your Lu Aguilera.     10 [facility]   Statement to Patients  April 1, 2014      In an effort to ensure the large volume of patient prescription refills is processed in the most efficient and expeditious manner, we are asking our patients to assist us by calling your Pharmacy for all prescription refills, this will include also your  Mail Order Pharmacy. The pharmacy will contact our office electronically to continue the refill process. Please do not wait until the last minute to call your pharmacy. We need at least 48 hours (2days) to fill prescriptions. We also encourage you to call your pharmacy before going to  your prescription to make sure it is ready. With regard to controlled substance prescription refill requests (narcotic refills) that need to be picked up at our office, we ask your cooperation by providing us with at least 72 hours (3days) notice that you will need a refill. We will not refill narcotic prescription refill requests after 4:00pm on any weekday, Monday through Thursday, or after 2:00pm on Fridays, or on the weekends. We encourage everyone to explore another way of getting your prescription refill request processed using [employer], our patient web portal through our electronic medical record system. [employer] is an efficient and effective way to communicate your medication request directly to the office and  downloadable as an francisco on your smart phone . [employer] also features a review functionality that allows you to view your medication list as well as leave messages for your physician. Are you ready to get connected? If so please review the attatched instructions or speak to any of our staff to get you set up right away! Thank you so much for your cooperation. Should you have any questions please contact our Practice Administrator. The Physicians and Staff,  [facility]     If we have ordered testing for you, we do not call patients with results and we do not give test results over the phone. We schedule follow up appointments so that your results can be discussed in person and any questions you have regarding them may be addressed.   If something of concern is revealed on your test, we will call you for a sooner follow up appointment. Additionally, results may be found by using the My Chart feature and one of our patient service representatives at the  can give you instructions on how to access this feature of our electronic medical record system. Learning About Living Hildred Faster  What is a living will? A living will is a legal form you use to write down the kind of care you want at the end of your life. It is used by the health professionals who will treat you if you aren't able to decide for yourself. If you put your wishes in writing, your loved ones and others will know what kind of care you want. They won't need to guess. This can ease your mind and be helpful to others. A living will is not the same as an estate or property will. An estate will explains what you want to happen with your money and property after you die. Is a living will a legal document? A living will is a legal document. Each state has its own laws about living ordaz. If you move to another state, make sure that your living will is legal in the state where you now live. Or you might use a universal form that has been approved by many states. This kind of form can sometimes be completed and stored online. Your electronic copy will then be available wherever you have a connection to the Internet. In most cases, doctors will respect your wishes even if you have a form from a different state. · You don't need an  to complete a living will. But legal advice can be helpful if your state's laws are unclear, your health history is complicated, or your family can't agree on what should be in your living will. · You can change your living will at any time. Some people find that their wishes about end-of-life care change as their health changes. · In addition to making a living will, think about completing a medical power of  form.  This form lets you name the person you want to make end-of-life treatment decisions for you (your \"health care agent\") if you're not able to. Many hospitals and nursing homes will give you the forms you need to complete a living will and a medical power of . · Your living will is used only if you can't make or communicate decisions for yourself anymore. If you become able to make decisions again, you can accept or refuse any treatment, no matter what you wrote in your living will. · Your state may offer an online registry. This is a place where you can store your living will online so the doctors and nurses who need to treat you can find it right away. What should you think about when creating a living will? Talk about your end-of-life wishes with your family members and your doctor. Let them know what you want. That way the people making decisions for you won't be surprised by your choices. Think about these questions as you make your living will:  · Do you know enough about life support methods that might be used? If not, talk to your doctor so you know what might be done if you can't breathe on your own, your heart stops, or you're unable to swallow. · What things would you still want to be able to do after you receive life-support methods? Would you want to be able to walk? To speak? To eat on your own? To live without the help of machines? · If you have a choice, where do you want to be cared for? In your home? At a hospital or nursing home? · Do you want certain Latter day practices performed if you become very ill? · If you have a choice at the end of your life, where would you prefer to die? At home? In a hospital or nursing home? Somewhere else? · Would you prefer to be buried or cremated? · Do you want your organs to be donated after you die? What should you do with your living will? · Make sure that your family members and your health care agent have copies of your living will. · Give your doctor a copy of your living will to keep in your medical record.  If you have more than one doctor, make sure that each one has a copy. · You may want to put a copy of your living will where it can be easily found. Where can you learn more? Go to http://saira-gogo.info/. Enter M025 in the search box to learn more about \"Learning About Living Perroy. \"  Current as of: August 8, 2016  Content Version: 11.3  © 4754-8409 [employer]. Care instructions adapted under license by [employer] (which disclaims liability or warranty for this information). If you have questions about a medical condition or this instruction, always ask your healthcare professional. [address] any warranty or liability for your use of this information.

## 2017-08-15 ENCOUNTER — HOSPITAL ENCOUNTER (OUTPATIENT)
Age: 77
Setting detail: OUTPATIENT SURGERY
Discharge: HOME | End: 2017-08-15
Attending: SPECIALIST | Admitting: SPECIALIST
Payer: MEDICARE

## 2017-08-15 ENCOUNTER — ANESTHESIA EVENT (OUTPATIENT)
Dept: ENDOSCOPY | Age: 77
End: 2017-08-15
Payer: MEDICARE

## 2017-08-15 ENCOUNTER — ANESTHESIA (OUTPATIENT)
Dept: ENDOSCOPY | Age: 77
End: 2017-08-15
Payer: MEDICARE

## 2017-08-15 VITALS
HEIGHT: 71 IN | WEIGHT: 260 LBS | OXYGEN SATURATION: 96 % | RESPIRATION RATE: 14 BRPM | SYSTOLIC BLOOD PRESSURE: 169 MMHG | BODY MASS INDEX: 36.4 KG/M2 | HEART RATE: 67 BPM | DIASTOLIC BLOOD PRESSURE: 93 MMHG | TEMPERATURE: 97.6 F

## 2017-08-15 LAB
GLUCOSE BLD STRIP.AUTO-MCNC: 137 MG/DL (ref 65–100)
SERVICE CMNT-IMP: ABNORMAL

## 2017-08-15 PROCEDURE — 74011000250 HC RX REV CODE- 250

## 2017-08-15 PROCEDURE — 82962 GLUCOSE BLOOD TEST: CPT

## 2017-08-15 PROCEDURE — 76060000031 HC ANESTHESIA FIRST 0.5 HR: Performed by: SPECIALIST

## 2017-08-15 PROCEDURE — 77030009426 HC FCPS BIOP ENDOSC BSC -B: Performed by: SPECIALIST

## 2017-08-15 PROCEDURE — 76040000019: Performed by: SPECIALIST

## 2017-08-15 PROCEDURE — 74011250636 HC RX REV CODE- 250/636: Performed by: PHYSICIAN ASSISTANT

## 2017-08-15 PROCEDURE — 88305 TISSUE EXAM BY PATHOLOGIST: CPT | Performed by: SPECIALIST

## 2017-08-15 PROCEDURE — 74011250636 HC RX REV CODE- 250/636

## 2017-08-15 PROCEDURE — 74011250637 HC RX REV CODE- 250/637: Performed by: PHYSICIAN ASSISTANT

## 2017-08-15 RX ORDER — PROPOFOL 10 MG/ML
INJECTION, EMULSION INTRAVENOUS
Status: DISCONTINUED | OUTPATIENT
Start: 2017-08-15 | End: 2017-08-15 | Stop reason: HOSPADM

## 2017-08-15 RX ORDER — PROPOFOL 10 MG/ML
INJECTION, EMULSION INTRAVENOUS AS NEEDED
Status: DISCONTINUED | OUTPATIENT
Start: 2017-08-15 | End: 2017-08-15 | Stop reason: HOSPADM

## 2017-08-15 RX ORDER — EPINEPHRINE 0.1 MG/ML
1 INJECTION INTRACARDIAC; INTRAVENOUS
Status: DISCONTINUED | OUTPATIENT
Start: 2017-08-15 | End: 2017-08-15 | Stop reason: HOSPADM

## 2017-08-15 RX ORDER — LIDOCAINE HYDROCHLORIDE 20 MG/ML
INJECTION, SOLUTION EPIDURAL; INFILTRATION; INTRACAUDAL; PERINEURAL AS NEEDED
Status: DISCONTINUED | OUTPATIENT
Start: 2017-08-15 | End: 2017-08-15 | Stop reason: HOSPADM

## 2017-08-15 RX ORDER — ATROPINE SULFATE 0.1 MG/ML
0.5 INJECTION INTRAVENOUS
Status: DISCONTINUED | OUTPATIENT
Start: 2017-08-15 | End: 2017-08-15 | Stop reason: HOSPADM

## 2017-08-15 RX ORDER — SODIUM CHLORIDE 9 MG/ML
50 INJECTION, SOLUTION INTRAVENOUS CONTINUOUS
Status: DISCONTINUED | OUTPATIENT
Start: 2017-08-15 | End: 2017-08-15 | Stop reason: HOSPADM

## 2017-08-15 RX ORDER — FENTANYL CITRATE 50 UG/ML
100 INJECTION, SOLUTION INTRAMUSCULAR; INTRAVENOUS ONCE
Status: DISCONTINUED | OUTPATIENT
Start: 2017-08-15 | End: 2017-08-15 | Stop reason: HOSPADM

## 2017-08-15 RX ORDER — SODIUM CHLORIDE 9 MG/ML
INJECTION, SOLUTION INTRAVENOUS
Status: DISCONTINUED | OUTPATIENT
Start: 2017-08-15 | End: 2017-08-15 | Stop reason: HOSPADM

## 2017-08-15 RX ORDER — FLUMAZENIL 0.1 MG/ML
0.2 INJECTION INTRAVENOUS
Status: DISCONTINUED | OUTPATIENT
Start: 2017-08-15 | End: 2017-08-15 | Stop reason: HOSPADM

## 2017-08-15 RX ORDER — MIDAZOLAM HYDROCHLORIDE 1 MG/ML
.25-5 INJECTION, SOLUTION INTRAMUSCULAR; INTRAVENOUS
Status: DISCONTINUED | OUTPATIENT
Start: 2017-08-15 | End: 2017-08-15 | Stop reason: HOSPADM

## 2017-08-15 RX ORDER — DEXTROMETHORPHAN/PSEUDOEPHED 2.5-7.5/.8
1.2 DROPS ORAL
Status: DISCONTINUED | OUTPATIENT
Start: 2017-08-15 | End: 2017-08-15 | Stop reason: HOSPADM

## 2017-08-15 RX ORDER — NALOXONE HYDROCHLORIDE 0.4 MG/ML
0.4 INJECTION, SOLUTION INTRAMUSCULAR; INTRAVENOUS; SUBCUTANEOUS
Status: DISCONTINUED | OUTPATIENT
Start: 2017-08-15 | End: 2017-08-15 | Stop reason: HOSPADM

## 2017-08-15 RX ADMIN — PROPOFOL 100 MCG/KG/MIN: 10 INJECTION, EMULSION INTRAVENOUS at 09:53

## 2017-08-15 RX ADMIN — SIMETHICONE 80 MG: 20 SUSPENSION/ DROPS ORAL at 09:58

## 2017-08-15 RX ADMIN — LIDOCAINE HYDROCHLORIDE 60 MG: 20 INJECTION, SOLUTION EPIDURAL; INFILTRATION; INTRACAUDAL; PERINEURAL at 09:53

## 2017-08-15 RX ADMIN — PROPOFOL 50 MG: 10 INJECTION, EMULSION INTRAVENOUS at 09:53

## 2017-08-15 RX ADMIN — PROPOFOL 50 MG: 10 INJECTION, EMULSION INTRAVENOUS at 09:51

## 2017-08-15 RX ADMIN — SODIUM CHLORIDE 50 ML/HR: 900 INJECTION, SOLUTION INTRAVENOUS at 08:27

## 2017-08-15 RX ADMIN — SODIUM CHLORIDE: 9 INJECTION, SOLUTION INTRAVENOUS at 09:30

## 2017-08-15 NOTE — DISCHARGE INSTRUCTIONS
[address] [facility] FAISAL Trujillo MD  [phone]      August 15, 2017    Luli Hays  YOB: 1940    COLONOSCOPY DISCHARGE INSTRUCTIONS    If there is redness at IV site you should apply warm compress to area. If redness or soreness persist contact Dr. Leta Trujillo' or your primary care doctor. There may be a slight amount of blood passed from the rectum. Gaseous discomfort may develop, but walking, belching will help relieve this. You may not operate a vehicle for 12 hours  You may not operate machinery or dangerous appliances for rest of today  You may not drink alcoholic beverages for 12 hours  Avoid making any critical decisions for 24 hours    DIET:  You may resume your normal diet, but some patients find that heavy or large meals may lead to indigestion or vomiting. I suggest a light meal as first food intake. MEDICATIONS:  The use of some over-the-counter pain medication may lead to bleeding after colon biopsies or polyp removal.  Tylenol (also called acetaminophen) is safe to take even if you have had colonoscopy with polyp removal.  Based on the procedure you had today you may not safely take aspirin or aspirin-like products for the next ten (10) days. Remember that Tylenol (also called acetaminophen) is safe to take after colonoscopy even if you have had biopsies or polyps removed. ACTIVITY:  You may resume your normal household activities, but it is recommended that you spend the remainder of the day resting -  avoid any strenuous activity. CALL DR. Jennie Cabello' OFFICE IF:  Increasing pain, nausea, vomiting  Abdominal distension (swelling)  Significant new or increased bleeding (oral or rectal)  Fever/Chills  Chest pain/shortness of breath                       Additional instructions:   No aspirin 10 days  Restart xarelto Friday, unless there is bleeding.   We discovered that the colon appears healthy, no inflammation, no polyps and no colon cancer. I took biopsies to make sure that the colon is healthy under the microscope. I will contact you with biopsy results to advise what to do for diarrhea after those are reviewed. It was an honor to be your doctor today. Please let me or my office staff know if you have any feedback about today's procedure. Raymon Murray MD    Colonoscopy saves lives, and can prevent colon cancer. Everyone aged 48 or older needs colonoscopy.   Tell your family and friends: get the test!

## 2017-08-15 NOTE — IP AVS SNAPSHOT
[address] 
[phone] Patient: Alisa Kline MRN: [id number] :1940 You are allergic to the following Allergen Reactions Atenolol Unknown (comments) Dilaudid (Hydromorphone) Unknown (comments) Lisinopril Unknown (comments) Oxycodone Unknown (comments) Recent Documentation Height Weight BMI Smoking Status 1.803 m 117.9 kg 36.26 kg/m2 Former Smoker Emergency Contacts Name Discharge Info Relation Home Work Mobile [address] CAREGIVER [3] Spouse [3] [phone] About your hospitalization You were admitted on:  August 15, 2017 You last received care in the:  [facility] ENDOSCOPY You were discharged on:  August 15, 2017 Unit phone number:  [phone] Why you were hospitalized Your primary diagnosis was:  Not on File Providers Seen During Your Hospitalizations Provider Role Specialty Primary office phone Davy Ferris MD Attending Provider Gastroenterology [phone] Your Primary Care Physician (PCP) Primary Care Physician Office Phone Office Fax Gina Lee [phone] Follow-up Information None Current Discharge Medication List  
  
CONTINUE these medications which have NOT CHANGED Dose & Instructions Dispensing Information Comments Morning Noon Evening Bedtime  
 allopurinol 300 mg tablet Commonly known as:  Theoplis Spry Your last dose was: Your next dose is:    
   
   
  Refills:  0  
     
   
   
   
  
 amLODIPine 5 mg tablet Commonly known as:  Sanaz Urrutia Your last dose was: Your next dose is:    
   
   
  Refills:  0  
     
   
   
   
  
 cholecalciferol 1,000 unit Cap Commonly known as:  VITAMIN D3 Your last dose was: Your next dose is: Take  by mouth daily. Refills:  0  
     
   
   
   
  
 escitalopram oxalate 20 mg tablet Commonly known as:  Colen Brunner Your last dose was: Your next dose is:    
   
   
 Dose:  20 mg Take 1 Tab by mouth daily. Quantity:  90 Tab Refills:  3  
     
   
   
   
  
 fexofenadine 180 mg tablet Commonly known as:  Eliseo Mary Your last dose was: Your next dose is: Take  by mouth. Refills:  0  
     
   
   
   
  
 glipiZIDE SR 5 mg CR tablet Commonly known as:  GLUCOTROL XL Your last dose was: Your next dose is:    
   
   
  Refills:  0  
     
   
   
   
  
 hydroCHLOROthiazide 25 mg tablet Commonly known as:  HYDRODIURIL Your last dose was: Your next dose is:    
   
   
  Refills:  0  
     
   
   
   
  
 imipramine 25 mg tablet Commonly known as:  TOFRANIL Your last dose was: Your next dose is:    
   
   
 Dose:  25 mg Take 25 mg by mouth three (3) times daily. Refills:  0  
     
   
   
   
  
 memantine-donepezil 28-10 mg Cspx Commonly known as:  [employer] [facility] Your last dose was: Your next dose is:    
   
   
 Dose:  1 Cap Take 1 Cap by mouth daily. Quantity:  30 Cap Refills:  1  
     
   
   
   
  
 metoprolol tartrate 25 mg tablet Commonly known as:  LOPRESSOR Your last dose was: Your next dose is:    
   
   
  Refills:  0  
     
   
   
   
  
 risperiDONE 0.5 mg tablet Commonly known as:  RisperDAL Your last dose was: Your next dose is:    
   
   
 Dose:  0.5 mg Take 1 Tab by mouth nightly. Quantity:  30 Tab Refills:  3  
     
   
   
   
  
 rivaroxaban 20 mg Tab tablet Commonly known as:  Lonzell Lust Your last dose was: Your next dose is: Take  by mouth daily. Refills:  0  
     
   
   
   
  
 simvastatin 20 mg tablet Commonly known as:  ZOCOR Your last dose was: Your next dose is: Refills:  0 SITagliptin-metFORMIN 50-1,000 mg per tablet Commonly known as:  Adine Lowers Your last dose was: Your next dose is:    
   
   
 Dose:  1 Tab Take 1 Tab by mouth two (2) times daily (with meals). Refills:  0 Discharge Instructions [address] Kaila Blinks. Jordyn Barrett, [address] [place] [address] 
[phone] August 15, 2017 Sarina Yates YOB: 1940 COLONOSCOPY DISCHARGE INSTRUCTIONS If there is redness at IV site you should apply warm compress to area. If redness or soreness persist contact Dr. Jordyn Barrett' or your primary care doctor. There may be a slight amount of blood passed from the rectum. Gaseous discomfort may develop, but walking, belching will help relieve this. You may not operate a vehicle for 12 hours You may not operate machinery or dangerous appliances for rest of today You may not drink alcoholic beverages for 12 hours Avoid making any critical decisions for 24 hours DIET: 
You may resume your normal diet, but some patients find that heavy or large meals may lead to indigestion or vomiting. I suggest a light meal as first food intake. MEDICATIONS: 
The use of some over-the-counter pain medication may lead to bleeding after colon biopsies or polyp removal.  Tylenol (also called acetaminophen) is safe to take even if you have had colonoscopy with polyp removal.  Based on the procedure you had today you may not safely take aspirin or aspirin-like products for the next ten (10) days. Remember that Tylenol (also called acetaminophen) is safe to take after colonoscopy even if you have had biopsies or polyps removed. ACTIVITY: 
You may resume your normal household activities, but it is recommended that you spend the remainder of the day resting -  avoid any strenuous activity. CALL DR. Apryl Farley' OFFICE IF: Increasing pain, nausea, vomiting Abdominal distension (swelling) Significant new or increased bleeding (oral or rectal) Fever/Chills Chest pain/shortness of breath Additional instructions:  
No aspirin 10 days Restart xarelto Friday, unless there is bleeding. We discovered that the colon appears healthy, no inflammation, no polyps and no colon cancer. I took biopsies to make sure that the colon is healthy under the microscope. I will contact you with biopsy results to advise what to do for diarrhea after those are reviewed. It was an honor to be your doctor today. Please let me or my office staff know if you have any feedback about today's procedure. Carrillo Santos MD 
 
Colonoscopy saves lives, and can prevent colon cancer. Everyone aged 48 or older needs colonoscopy. Tell your family and friends: get the test! 
 
 
 
 
Discharge Orders None Introducing [facility] SERVICES! Nino Magallon introduces [employer] patient portal. Now you can access parts of your medical record, email your doctor's office, and request medication refills online. 1. In your internet browser, go to [email or web address] 2. Click on the First Time User? Click Here link in the Sign In box. You will see the New Member Sign Up page. 3. Enter your [employer] Access Code exactly as it appears below. You will not need to use this code after youve completed the sign-up process. If you do not sign up before the expiration date, you must request a new code. · [employer] Access Code: [email or web address] Expires: 8/16/2017  3:34 PM 
 
4. Enter the last four digits of your Social Security Number (xxxx) and Date of Birth (mm/dd/yyyy) as indicated and click Submit. You will be taken to the next sign-up page. 5. Create a [employer] ID. This will be your [employer] login ID and cannot be changed, so think of one that is secure and easy to remember. 6. Create a [employer] password. You can change your password at any time. 7. Enter your Password Reset Question and Answer. This can be used at a later time if you forget your password. 8. Enter your e-mail address. You will receive e-mail notification when new information is available in [address] 9. Click Sign Up. You can now view and download portions of your medical record. 10. Click the Download Summary menu link to download a portable copy of your medical information. If you have questions, please visit the Frequently Asked Questions section of the [employer] website. Remember, [employer] is NOT to be used for urgent needs. For medical emergencies, dial 911. Now available from your iPhone and Android! General Information Please provide this summary of care documentation to your next provider. Patient Signature:  ____________________________________________________________ Date:  ____________________________________________________________  
  
Gabrielle Finger Provider Signature:  ____________________________________________________________ Date:  ____________________________________________________________

## 2017-08-15 NOTE — INTERVAL H&P NOTE
H&P Update:  Graeme Rogers was seen and examined. History and physical has been reviewed. The patient has been examined.  There have been no significant clinical changes since the completion of the originally dated History and Physical.    Signed By: Joe Stevenson MD     August 15, 2017 10:05 AM

## 2017-08-15 NOTE — ROUTINE PROCESS
Brenda Georgia  1940  [id number]    Situation:  Verbal report received from: Shayna Burr RN  Procedure: Procedure(s):  COLONOSCOPY  COLON BIOPSY    Background:    Preoperative diagnosis: SCREENING, DIARRHEA  Postoperative diagnosis: * No post-op diagnosis entered *    :  Dr. Jacky Rodriguez  Assistant(s): Endoscopy Technician-1: Liliana Sanz  Endoscopy RN-1: Amy Jason RN    Specimens:   ID Type Source Tests Collected by Time Destination   1 : Random Colon Biopsies Preservative   Jesse Aguilar MD 8/15/2017 1001 Pathology     H. Pylori  no    Assessment:  Intra-procedure medications   Anesthesia gave intra-procedure sedation and medications, see anesthesia flow sheet yes    Intravenous fluids: NS@ KVO     Vital signs stable     Abdominal assessment: round and soft     Recommendation:  Discharge patient per MD order.   Family or Friend   Permission to share finding with family or friend yes

## 2017-08-15 NOTE — PROCEDURES
[address] [facility] FAISAL Lee MD  [phone]      August 15, 2017    Colonoscopy Procedure Note  Loretta Alonso  :  1940  Justin Medical Record Number: [id number]    Indications:     Diarrhea, Screening colonoscopy  PCP:  Yadi Washington MD  Anesthesia/Sedation: Conscious Sedation/Moderate Sedation  Endoscopist:  Dr. Letitia Valdez  Complications:  None  Estimated Blood Loss:  None    Permit:  The indications, risks, benefits and alternatives were reviewed with the patient or their decision maker who was provided an opportunity to ask questions and all questions were answered. The specific risks of colonoscopy with conscious sedation were reviewed, including but not limited to anesthetic complication, bleeding, adverse drug reaction, missed lesion, infection, IV site reactions, and intestinal perforation which would lead to the need for surgical repair. Alternatives to colonoscopy including radiographic imaging, observation without testing, or laboratory testing were reviewed including the limitations of those alternatives. After considering the options and having all their questions answered, the patient or their decision maker provided both verbal and written consent to proceed. Procedure in Detail:  After obtaining informed consent, positioning of the patient in the left lateral decubitus position, and conduction of a pre-procedure pause or \"time out\" the endoscope was introduced into the anus and advanced to the cecum, which was identified by the ileocecal valve and appendiceal orifice. The quality of the colonic preparation was adequate. A careful inspection was made as the colonoscope was withdrawn, findings and interventions are described below. Findings:   No polyps or inflammatory changes of the colon are identified.   We took random biopsies of normal appearing colon mucosa for exclusion of microscopic or collagenous colitis. Specimens:    See above    Complications:   None; patient tolerated the procedure well. Impression:  Normal colonoscopy to the cecum, with no evidence of neoplasia, diverticular disease, or mucosal abnormality. Recommendations:     - Await pathology. If normal then treat diarrhea as IBS-D, could try probiotics/antispasmodics or xifaxan as first steps. Thank you for entrusting me with this patient's care. Please do not hesitate to contact me with any questions or if I can be of assistance with any of your other patients' GI needs.     Signed By: Fred Fernandez MD                        August 15, 2017

## 2017-08-15 NOTE — ANESTHESIA PREPROCEDURE EVALUATION
Anesthetic History   No history of anesthetic complications            Review of Systems / Medical History  Patient summary reviewed    Pulmonary  Within defined limits                 Neuro/Psych         TIA and dementia     Cardiovascular    Hypertension        Dysrhythmias : atrial fibrillation      Exercise tolerance: >4 METS     GI/Hepatic/Renal  Within defined limits              Endo/Other    Diabetes: type 2         Other Findings              Physical Exam    Airway  Mallampati: III  TM Distance: 4 - 6 cm  Neck ROM: normal range of motion   Mouth opening: Normal     Cardiovascular    Rhythm: irregular  Rate: normal         Dental  No notable dental hx       Pulmonary  Breath sounds clear to auscultation               Abdominal         Other Findings            Anesthetic Plan    ASA: 3  Anesthesia type: MAC            Anesthetic plan and risks discussed with: Patient and Spouse

## 2017-08-15 NOTE — ANESTHESIA POSTPROCEDURE EVALUATION
Post-Anesthesia Evaluation and Assessment    Patient: Laila Daley MRN: [id number]  SSN: [id number]    YOB: 1940  Age: 67 y.o. Sex: male       Cardiovascular Function/Vital Signs  Visit Vitals    BP (!) 169/93    Pulse 67    Temp 36.4 °C (97.6 °F)    Resp 14    Ht 5' 11\" (1.803 m)    Wt 117.9 kg (260 lb)    SpO2 96%    BMI 36.26 kg/m2       Patient is status post MAC anesthesia for Procedure(s):  COLONOSCOPY  COLON BIOPSY. Nausea/Vomiting: None    Postoperative hydration reviewed and adequate. Pain:  Pain Scale 1: Numeric (0 - 10) (08/15/17 1042)  Pain Intensity 1: 0 (08/15/17 1042)   Managed    Neurological Status: At baseline    Mental Status and Level of Consciousness: Arousable    Pulmonary Status:   O2 Device: Room air (08/15/17 1042)   Adequate oxygenation and airway patent    Complications related to anesthesia: None    Post-anesthesia assessment completed.  No concerns    Signed By: Chhaya More MD     August 15, 2017

## 2017-08-15 NOTE — H&P
Date: 2017 3:30 PM   Patient Name: Isidra Vanessa   Account #: [id number]    Gender: Male    (age): 1940 (67)       Provider:     Riaz Wong. Toby Avila MD        Referring Physician:     MD Van Garcia[address]  [phone] (phone)  [phone] (fax)        Chief Complaint: Diarrhea           History of Present Illness: The patient complains of diarrhea. Diarrheal symptoms started several months ago. Bowel movements have been occurring 1 - 3 times per day. At the onset, diarrhea started intermittently. Stools have been occurring at random times of the day. The stool is described as loose in consistency. The patient has not seen blood in the stool. The patient has also noted fecal urgency and fecal incontinence. ? The patient complains of diarrhea. ?  Diarrheal symptoms started ? several? ?months? ago. ? ? Bowel movements have been occurring ? 1 - 3? times per day. ? ? At the onset, diarrhea started ?intermittently? . ? ?Stools have been occurring ? at random times of the day? . ? ? The stool is described as ?loose? in consistency. ? ?The patient ?has not? seen blood in the stool. ? ? The patient has also noted ?fecal urgency and fecal incontinence? . ? ?Possible exposures/etiologies include ? [Possible exposures]? . ? ? Suspected exacerbating factors include ? [exacerbating foods]? . ? ?Diarrhea is alleviated so far by ? [Relieving factors (diarrhea)]? . ? ?The patient has already tried taking ? [Diarrhea meds tried]? . ? ?Has noted ? [degree of symptom relief]? relief. ? ?Has had ? [Diarrhea tests]? performed thus far for evaluation. ? ?A prior colonoscopy was performed ? [C-scope years ago]? years ago. ? ?        Past Medical History      Medical Conditions: Anemia  Arthritis  Atrial Fibrillation  Diabetes Mellitus  High blood pressure  High cholesterol  Personal history of malignant neoplasm of prostate   Surgical Procedures: Knee Surgery ( Left )  Prostatectomy  Knee Surgery ( Right)  cyst removal lower back   Dx Studies: Colonoscopy, 2005   Medications: allopurinol 300 mg  celecoxib 200 mg  cholecalciferol (vitamin D3) 1,000 unit  diphenhydramine HCl 50 mg  escitalopram oxalate 20 mg  fexofenadine 180 mg  glipizide 5 mg  hydrochlorothiazide 25 mg  imipramine HCl 25 mg  Janumet 50-1,000 mg  metoprolol succinate 50 mg  Namzaric 28-10 mg  simvastatin 20 mg  tramadol 50 mg  Xarelto 20 mg   Allergies: Patient has no known allergies or drug allergies   Immunizations: Flu vaccine, 2016  Pneumococcal conjugate PCV 13, 2012      Social History      Alcohol: Alcohol consumption: Weekly. Tobacco: Former smokerOther, quit 11/15/68. Drugs: None   Exercise: Exercise less than 3 times a week. Caffeine: Daily. Marital Status:          Occupation:               Family History No history of Colon Cancer, Colon Polyps, Esophogeal Cancer, GI Cancers, IBD (Crohn's or UC), Liver disease  Sister: Diagnosed with Family history of colon polyps; Father: Diagnosed with prostate cancer; Mother: Diagnosed with Family history of colon polyps, mouth cancer;       Review of Systems:   Cardiovascular: Denies chest pain, irregular heart beat, palpitations, peripheral edema, syncope, Sweats. Constitutional: Presents suffers from fatigue, loss of appetite. Denies fever, weight gain, weight loss. ENMT: Presents suffers from hearing loss. Denies nose bleeds, sore throat. Endocrine: Denies excessive thirst, heat intolerance. Eyes: Presents suffers from loss of vision. .    Gastrointestinal: Presents suffers from change in bowel habits, diarrhea, Bloating/gas, heartburn, stomach cramps. Denies abdominal pain, abdominal swelling, constipation, jaundice, nausea, rectal bleeding, vomiting, dysphagia, rectal pain, Stool incontinence. Genitourinary: Presents suffers from incontinence. Denies dark urine, dysuria, frequent urination, hematuria.    Hematologic/Lymphatic: Presents suffers from easy bruising, prolonged bleeding. .    Integumentary: Denies itching, rashes, sun sensitivity. Musculoskeletal: Presents suffers from arthritis, joint pain. Denies back pain, gout, muscle weakness, stiffness. Neurological: Presents suffers from memory loss. Denies dizziness, fainting, frequent headaches. Psychiatric: Denies anxiety, depression, difficulty sleeping, hallucinations, nervousness, panic attacks, paranoia. Respiratory: Denies cough, dyspnea, wheezing. Vital Signs:   BP  (mmHg)  Pulse  (ppm) Rhythm Weight (lbs/oz) Height (ft/in) BMI Resp/min Temp   132/98 82 Regular 262 / 2 6 / 0 35.55 12 99 (F)   Notes SPO2 96%      Physical Exam:   Constitutional:      Appearance: No distress, appears comfortable. Communication: Understands/receives spoken information. Skin:      Inspection: No rash, no jaundice. Palpation: No subcutaneous nodules. Head/face: Inspection: Normacephalic, atraumatic. Palpation: normal.   Eyes:      Conjunctivae/lids: Normal.   Pupils/irises: Pupils equal, round and normal.   ENMT:      External: Normal.   Hearing: Normal.   Neck:      Neck: Normal appearance, trachea midline. Jugular veins: No JVD noted. Respiratory:      Effort: Normal respiratory effort, comfortable, speaks in complete sentences. Auscultation: normal breath sounds, no rubs, wheezes or rhonchi. Gastrointestinal/Abdomen:      Abdomen: non-distended, nontender. Liver/Spleen: normal, normal size, Liver size and consistency normal, spleen is non-palpable. Musculoskeletal:      Gait/station: normal.   Digits/nails: Normal, no spooning of nails, clubbing, or splinter hemorrhages, no clubbing, cyanosis, petechiae or other inflammatory conditions. Psychiatric:      Judgment/insight: Normal, normal judgement, normal insight. Orientation: oriented to time, space and person. Lymphatic:      Neck: No lymphadenopathy in the cervical or supraclavicular chain.    Other: No periumbilic lymphadenopathy. Lab Results: No Electronic Results      Impressions: Screening colonoscopy (Screening for colonic neoplasia)  Diarrhea, unspecified? ?Screening colonoscopy (Screening for colonic neoplasia)? ?  ? ? Diarrhea, unspecified? Assessment: ?         Plan: Colonoscopy - hold xarelto 2 days prior to the procedure  TriLyte With Flavor Packets 420 gram take as directed? ? Colonoscopy? - hold xarelto 2 days prior to the procedure? ? ?TriLyte With Flavor Packets? ?420 gram? ?take as directed? ? Risk & Medical Necessity: The patient requires Moderate to High Severity care for this visit. Diagnosis and management options are Multiple. The amount of data reviewed and/or ordered is Minimal/None. The level of risk is Moderate. Notes:              Juliano Beltran MD     Electronically signed on 2017 3:37:05 PM by MD Divya Nye.  Nj Red, MRN [id number],  1940 First Visit,                                                                                                                                                         New     Modify          Delete     Delete all     Edit Wording          Sign     [email or web address]

## 2017-08-15 NOTE — PERIOP NOTES
Report from Eliseo Verma, [facility], see anesthesia record. ABD remains soft and non-tender post procedure. Pt has no complaints at this time and tolerated the procedure well.

## 2017-08-17 ENCOUNTER — HOSPITAL ENCOUNTER (OUTPATIENT)
Dept: PHYSICAL THERAPY | Age: 77
Discharge: HOME | End: 2017-08-17
Payer: MEDICARE

## 2017-08-17 PROCEDURE — 97112 NEUROMUSCULAR REEDUCATION: CPT | Performed by: PHYSICAL THERAPIST

## 2017-08-17 PROCEDURE — 97162 PT EVAL MOD COMPLEX 30 MIN: CPT | Performed by: PHYSICAL THERAPIST

## 2017-08-17 PROCEDURE — 97110 THERAPEUTIC EXERCISES: CPT | Performed by: PHYSICAL THERAPIST

## 2017-08-17 PROCEDURE — G8978 MOBILITY CURRENT STATUS: HCPCS | Performed by: PHYSICAL THERAPIST

## 2017-08-17 PROCEDURE — G8979 MOBILITY GOAL STATUS: HCPCS | Performed by: PHYSICAL THERAPIST

## 2017-08-17 NOTE — PROGRESS NOTES
PT INITIAL EVALUATION NOTE - [facility] 2-15    Patient Name: Ronaldo Corado  Date:2017  : 1940  []  Patient  Verified  Payor: Janae Garciajose juan / Plan: [place] & B / Product Type: Medicare /    In time:8:05 AM  Out time:8:50 AM  Total Treatment Time (min): 45  Total Timed Codes (min): 20  1:1 Treatment Time ( [address] only): 45   Visit #: 1     Treatment Area: Other abnormalities of gait and mobility [R26.89]    SUBJECTIVE  Pain Level (0-10 scale): 0 \"discomfort in my knees\"  Any medication changes, allergies to medications, adverse drug reactions, diagnosis change, or new procedure performed?: [] No    [x] Yes (see summary sheet for update)  Subjective:    Pt reports he saw his Neurologist this week who referred him to PT. \"I fell last week and fortunately did not hurt anything. \" Pt c/o weakness, imbalance. \"A lot of it has to do with confidence. \" \"I'm not comfortable on my knees, I had a R TKR last year. \" \"Since my knee surgeries I have had a fear of falling. \" Pt reports he has to sit down after standing 10-15 minutes. Pt reports symptoms have worsened over the past year. Pt reports since his TKR he has started to use a cane and a walker for traveling long distance. Pt denies numbness/tingling. Pt reports in the morning he has more imbalance than in the afternoon. Pt denies dizziness. Pt reports his symptoms are worse in the dark and after prolonged standing  Pt drives as needed  \"I try to avoid steps\"    Pt reports difficulty turning without LOB  PLOF: Walking without AD, He enjoys going to [employer] on   Mechanism of Injury: Hospitalization   Previous Treatment/Compliance: Yes, for B TKR  PMHx/Surgical Hx: Dementia, B TKR, A fib  Work Hx: Retired  Living Situation: Pt lives in a cht with his wife  Pt Goals: \"To be able to get around more confidently without fear of falling. \"  Barriers: Complex PMH  Motivation: Excellent  Substance use: Alcohol  FABQ Score: See FOTO  Cognition: A & O x 3        OBJECTIVE/EXAMINATION  Posture:  Forward head, rounded shoulders  Gait: Pt ambulating with SPC and reaching for wall when ambulating to treatment room                  LOWER QUARTER   MUSCLE STRENGTH  KEY       R  L  0 - No Contraction  L1, L2 Psoas  4  4-    1 - Trace   L3 Quads  4+  4+    2 - Poor   L4 Tib Ant  4-  3+    3 - Fair    L5 EHL  5  5    4 - Good   S1 FHL  5  5    5 - Normal   S2 Hams  4+  4+      Balance/ Equilibrium:         Standing Balance: 30    Static: EO 30 seconds, EC seconds   Rhomberg: EO 30 seconds, EC 2-3 seconds   Sharpened Rhomberg:  EO 2-3 seconds, EC NT   Dynamic: Poor    Functional Mobility      Transfers:       Sit-Stand: Pt requires BUE to t/f sit to stand and stand to sit      Stairs: step to gait pattern for stair ascension and decension, 2 railings, slight increase in knee pain    Optional Tests:   TU seconds with SPC, UE assist to t/f sit to stand    10 min Therapeutic Exercise:  [x] See flow sheet :   Rationale: increase ROM and increase strength to improve the patients ability to sit, stand, transfer, ambulate, lift, carry, reach, complete ADLs    10 min Neuromuscular Re-education:  [x]  See flow sheet :   Rationale: improve coordination, improve balance and increase proprioception  to improve the patients ability to sit, stand, transfer, ambulate, lift, carry, reach, complete ADLs        With   [x] TE   [] TA   [x] neuro   [] other: Patient Education: [x] Review HEP    [] Progressed/Changed HEP based on:   [x] positioning   [x] body mechanics   [] transfers   [] heat/ice application    [] other:        Pain Level (0-10 scale) post treatment: \"Knee discomfort\"    ASSESSMENT/Changes in Function:     [x]  See Plan of [address] 2017  8:02 AM

## 2017-08-17 NOTE — PROGRESS NOTES
[address] [facility] [address]  Phone: [phone]  Fax: [phone]    Plan of Care/Statement of Necessity for Physical Therapy Services  2-15    Patient name: Alisa Kline  : 1940  Provider#: [id number]  Referral source: Barney Rowland MD      Medical/Treatment Diagnosis: Other abnormalities of gait and mobility [R26.89]     Prior Hospitalization: see medical history     Comorbidities: Dementia, B TKR, A fib, DM, arthritis, [place]  Prior Level of Function: Walking without AD, He enjoys going to [employer] on   Medications: Verified on Patient Summary List  Start of Care: 17      Onset Date: 1 year ago   The Plan of Care and following information is based on the information from the initial evaluation. Assessment/ key information: The patient presents with progressively worsening balance complicated by complex PMH, inactive lifestyle, and recent R TKR. The patient completed TUG in 22 seconds indicating increased risk of falls. The patient has significant weakness through LLE, especially in L ankle DF, contributing to poor gait mechanics. Evaluation Complexity History HIGH Complexity :3+ comorbidities / personal factors will impact the outcome/ POC ; Examination HIGH Complexity : 4+ Standardized tests and measures addressing body structure, function, activity limitation and / or participation in recreation  ;Presentation MEDIUM Complexity : Evolving with changing characteristics  ; Clinical Decision Making MEDIUM Complexity : FOTO score of 26-74  Overall Complexity Rating: MEDIUM    Problem List: pain affecting function, decrease ROM, decrease strength, impaired gait/ balance, decrease ADL/ functional abilitiies, decrease activity tolerance, decrease flexibility/ joint mobility and decrease transfer abilities   Treatment Plan may include any combination of the following: Therapeutic exercise, Neuromuscular re-education, Physical agent/modality, Gait/balance training, Manual therapy, Patient education, Functional mobility training, Home safety training and Stair training  Patient / Family readiness to learn indicated by: asking questions, trying to perform skills and interest  Persons(s) to be included in education: patient (P) and family support person (FSP);list wife  Barriers to Learning/Limitations: yes;  cognitive (dementia) and sensory deficits-vision/hearing/speech  Patient Goal (s): \"To be able to get around more confidently without fear of falling. \"  Patient Self Reported Health Status: fair  Rehabilitation Potential: good    Short Term Goals: To be accomplished in 4 weeks:  1) The patient will be independent with introductory HEP  2) The patient will demonstrate negative Romberg test to indicate decreased risk of falls  3) The patient will demonstrate ability to transfer sit to stand without UE assist  Long Term Goals: To be accomplished in 12 weeks:  1) The patient will complete TUG test in 15 seconds or less to indicate improved functional mobility  2) The patient will negotiate 12 steps reciprocally with 1 railing, without LOB  3) The patient will demonstrate 4+/5 BLE strength to improve ease with household chores  Frequency / Duration: Patient to be seen 2 times per week for 12 weeks. Patient/ Caregiver education and instruction: self care, activity modification and exercises    [x]  Plan of care has been reviewed with PTA    G-Codes (GP)  Mobility   Current  CK= 40-59%   Goal  CK= 40-59%    The severity rating is based on clinical judgment and the FOTO Score score. Certification Period: 8/17/17-11/17/17  Duarte Lynn, PT 8/17/2017 8:52 AM    ________________________________________________________________________    I certify that the above Therapy Services are being furnished while the patient is under my care. I agree with the treatment plan and certify that this therapy is necessary.     [facility] Signature:____________________  Date:____________Time: _________

## 2017-08-21 ENCOUNTER — APPOINTMENT (OUTPATIENT)
Dept: PHYSICAL THERAPY | Age: 77
End: 2017-08-21
Payer: MEDICARE

## 2017-08-24 ENCOUNTER — HOSPITAL ENCOUNTER (OUTPATIENT)
Dept: PHYSICAL THERAPY | Age: 77
Discharge: HOME | End: 2017-08-24
Payer: MEDICARE

## 2017-08-24 PROCEDURE — 97112 NEUROMUSCULAR REEDUCATION: CPT | Performed by: PHYSICAL THERAPIST

## 2017-08-24 PROCEDURE — 97110 THERAPEUTIC EXERCISES: CPT | Performed by: PHYSICAL THERAPIST

## 2017-08-28 ENCOUNTER — HOSPITAL ENCOUNTER (OUTPATIENT)
Dept: PHYSICAL THERAPY | Age: 77
Discharge: HOME | End: 2017-08-28
Payer: MEDICARE

## 2017-08-28 PROCEDURE — 97110 THERAPEUTIC EXERCISES: CPT | Performed by: PHYSICAL THERAPIST

## 2017-08-28 PROCEDURE — 97112 NEUROMUSCULAR REEDUCATION: CPT | Performed by: PHYSICAL THERAPIST

## 2017-08-28 NOTE — PROGRESS NOTES
PT DAILY TREATMENT NOTE - [facility] 2-15    Patient Name: Viviana Thibodeaux  Date:2017  : 1940  [x]  Patient  Verified  Payor: Aura Martinez / Plan: [place] MEDICARE PART A & B / Product Type: Medicare /    In time:12:00 PM  Out time: 1:00 PM  Total Treatment Time (min): 60  Total Timed Codes (min): 60  1:1 Treatment Time ( [address] only): 60  Visit #: 3    Treatment Area:  Other abnormalities of gait and mobility [R26.89]    SUBJECTIVE  Pain Level (0-10 scale): 3  Any medication changes, allergies to medications, adverse drug reactions, diagnosis change, or new procedure performed?: [x] No    [] Yes (see summary sheet for update)  Subjective functional status/changes:   [] No changes reported  Pt reports he has trouble getting to his exercises twice a day    OBJECTIVE    50 min Therapeutic Exercise:  [x] See flow sheet :   Rationale: increase ROM and increase strength to improve the patients ability to sit, stand, transfer, ambulate, lift, carry, reach, complete ADLs    10 min Neuromuscular Re-education:  [x]  See flow sheet :   Rationale: improve coordination, improve balance and increase proprioception  to improve the patients ability to sit, stand, transfer, ambulate, lift, carry, reach, complete ADLs        With   [x] TE   [] TA   [x] neuro   [] other: Patient Education: [x] Review HEP    [] Progressed/Changed HEP based on:   [x] positioning   [x] body mechanics   [] transfers   [x] heat/ice application    [] other:      Other Objective/Functional Measures:   Continued difficulty with static stance balance exercises  Pt required several seated rest breaks    Pain Level (0-10 scale) post treatment: 0    ASSESSMENT/Changes in Function:     Patient will continue to benefit from skilled PT services to modify and progress therapeutic interventions, address functional mobility deficits, address ROM deficits, address strength deficits, analyze and address soft tissue restrictions, analyze and cue movement patterns, analyze and modify body mechanics/ergonomics and assess and modify postural abnormalities to attain remaining goals. []  See Plan of Care  []  See progress note/recertification  []  See Discharge Summary         Progress towards goals / Updated goals:  Patient continues to require verbal cues to complete exercises with correct form and postural awareness. Patient was able to advance several exercises this visit and is progressing well towards goals.     PLAN  [x]  Upgrade activities as tolerated     [x]  Continue plan of care  [x]  Update interventions per flow sheet       []  Discharge due to:_  []  Other:_      Trung Boyd, PT 8/28/2017  1:00 PM

## 2017-08-31 ENCOUNTER — HOSPITAL ENCOUNTER (OUTPATIENT)
Dept: PHYSICAL THERAPY | Age: 77
Discharge: HOME | End: 2017-08-31
Payer: MEDICARE

## 2017-08-31 PROCEDURE — 97110 THERAPEUTIC EXERCISES: CPT | Performed by: PHYSICAL THERAPIST

## 2017-08-31 PROCEDURE — 97112 NEUROMUSCULAR REEDUCATION: CPT | Performed by: PHYSICAL THERAPIST

## 2017-08-31 NOTE — PROGRESS NOTES
PT DAILY TREATMENT NOTE - [facility] 2-15    Patient Name: Elmira Antonio  Date:2017  : 1940  [x]  Patient  Verified  Payor: Mahin Lopez / Plan: [place] MEDICARE PART A & B / Product Type: Medicare /    In time:10:05 AM  Out time: 11:05 AM  Total Treatment Time (min): 60  Total Timed Codes (min): 60  1:1 Treatment Time ( [address] only): 35  Visit #: 4    Treatment Area: Other abnormalities of gait and mobility [R26.89]    SUBJECTIVE  Pain Level (0-10 scale):  2  Any medication changes, allergies to medications, adverse drug reactions, diagnosis change, or new procedure performed?: [x] No    [] Yes (see summary sheet for update)  Subjective functional status/changes:   [] No changes reported  Pt reports he is having some R knee discomfort this morning  Pt reports he was tired after last visit    OBJECTIVE    50 min Therapeutic Exercise:  [x] See flow sheet :   Rationale: increase ROM and increase strength to improve the patients ability to sit, stand, transfer, ambulate, lift, carry, reach, complete ADLs    10 min Neuromuscular Re-education:  [x]  See flow sheet :   Rationale: improve coordination, improve balance and increase proprioception  to improve the patients ability to sit, stand, transfer, ambulate, lift, carry, reach, complete ADLs        With   [x] TE   [] TA   [x] neuro   [] other: Patient Education: [x] Review HEP    [] Progressed/Changed HEP based on:   [x] positioning   [x] body mechanics   [] transfers   [x] heat/ice application    [] other:      Other Objective/Functional Measures:   Pt able to hold NBOS EC for 20 seconds without LOB    Pain Level (0-10 scale) post treatment: 0    ASSESSMENT/Changes in Function:     Patient will continue to benefit from skilled PT services to modify and progress therapeutic interventions, address functional mobility deficits, address ROM deficits, address strength deficits, analyze and address soft tissue restrictions, analyze and cue movement patterns, analyze and modify body mechanics/ergonomics and assess and modify postural abnormalities to attain remaining goals. []  See Plan of Care  []  See progress note/recertification  []  See Discharge Summary         Progress towards goals / Updated goals:  Patient continues to require verbal cues to complete exercises with correct form and postural awareness. Patient was able to advance several exercises this visit and is progressing well towards goals.     PLAN  [x]  Upgrade activities as tolerated     [x]  Continue plan of care  [x]  Update interventions per flow sheet       []  Discharge due to:_  []  Other:_      Ivon Santiago, PT 8/31/2017  10:05 AM

## 2017-09-05 ENCOUNTER — HOSPITAL ENCOUNTER (OUTPATIENT)
Dept: PHYSICAL THERAPY | Age: 77
Discharge: HOME | End: 2017-09-05
Payer: MEDICARE

## 2017-09-05 PROCEDURE — 97110 THERAPEUTIC EXERCISES: CPT | Performed by: PHYSICAL THERAPIST

## 2017-09-05 PROCEDURE — 97112 NEUROMUSCULAR REEDUCATION: CPT | Performed by: PHYSICAL THERAPIST

## 2017-09-05 NOTE — PROGRESS NOTES
PT DAILY TREATMENT NOTE - [facility] 2-15    Patient Name: Matt Rankin  Date:2017  : 1940  [x]  Patient  Verified  Payor: Jack Flanagan / Plan: [place] / Product Type: Medicare /    In time:10:20 AM  Out time: 11:20 AM  Total Treatment Time (min): 60  Total Timed Codes (min): 60  1:1 Treatment Time ( [address] only): 60  Visit #: 5    Treatment Area:  Other abnormalities of gait and mobility [R26.89]    SUBJECTIVE  Pain Level (0-10 scale): 3  Any medication changes, allergies to medications, adverse drug reactions, diagnosis change, or new procedure performed?: [x] No    [] Yes (see summary sheet for update)  Subjective functional status/changes:   [] No changes reported  Pt reports he is feeling pretty good    OBJECTIVE    50 min Therapeutic Exercise:  [x] See flow sheet :   Rationale: increase ROM and increase strength to improve the patients ability to sit, stand, transfer, ambulate, lift, carry, reach, complete ADLs    10 min Neuromuscular Re-education:  [x]  See flow sheet :   Rationale: improve coordination, improve balance and increase proprioception  to improve the patients ability to sit, stand, transfer, ambulate, lift, carry, reach, complete ADLs        With   [x] TE   [] TA   [x] neuro   [] other: Patient Education: [x] Review HEP    [] Progressed/Changed HEP based on:   [x] positioning   [x] body mechanics   [] transfers   [x] heat/ice application    [] other:      Other Objective/Functional Measures:   Pt able to hold NBOS with EC 1/3 trials for 30 seconds  Pt with an increase in knee pain following step ups    Pain Level (0-10 scale) post treatment: 5    ASSESSMENT/Changes in Function:     Patient will continue to benefit from skilled PT services to modify and progress therapeutic interventions, address functional mobility deficits, address ROM deficits, address strength deficits, analyze and address soft tissue restrictions, analyze and cue movement patterns, analyze and modify body mechanics/ergonomics and assess and modify postural abnormalities to attain remaining goals. []  See Plan of Care  []  See progress note/recertification  []  See Discharge Summary         Progress towards goals / Updated goals:  Patient continues to require verbal cues to complete exercises with correct form and postural awareness. Patient was able to advance several exercises this visit and is progressing well towards goals.     PLAN  [x]  Upgrade activities as tolerated     [x]  Continue plan of care  [x]  Update interventions per flow sheet       []  Discharge due to:_  []  Other:_      Judy Edmonds, PT 9/5/2017  11:00 AM

## 2017-09-07 ENCOUNTER — HOSPITAL ENCOUNTER (OUTPATIENT)
Dept: PHYSICAL THERAPY | Age: 77
Discharge: HOME | End: 2017-09-07
Payer: MEDICARE

## 2017-09-07 PROCEDURE — 97110 THERAPEUTIC EXERCISES: CPT | Performed by: PHYSICAL THERAPIST

## 2017-09-07 PROCEDURE — 97112 NEUROMUSCULAR REEDUCATION: CPT | Performed by: PHYSICAL THERAPIST

## 2017-09-07 NOTE — PROGRESS NOTES
PT DAILY TREATMENT NOTE - [facility] 2-15    Patient Name: Zoila Cervantes  Date:2017  : 1940  [x]  Patient  Verified  Payor: Ethan Rodriguez / Plan: [place] MEDICARE PART A & B / Product Type: Medicare /    In time:10:25 AM  Out time: 11:25 AM  Total Treatment Time (min): 60  Total Timed Codes (min): 60  1:1 Treatment Time ( [address] only): 35  Visit #: 6    Treatment Area:  Other abnormalities of gait and mobility [R26.89]    SUBJECTIVE  Pain Level (0-10 scale): 3-4  Any medication changes, allergies to medications, adverse drug reactions, diagnosis change, or new procedure performed?: [x] No    [] Yes (see summary sheet for update)  Subjective functional status/changes:   [] No changes reported  Pt reports he is feeling pretty good    OBJECTIVE    50 min Therapeutic Exercise:  [x] See flow sheet :   Rationale: increase ROM and increase strength to improve the patients ability to sit, stand, transfer, ambulate, lift, carry, reach, complete ADLs    10 min Neuromuscular Re-education:  [x]  See flow sheet :   Rationale: improve coordination, improve balance and increase proprioception  to improve the patients ability to sit, stand, transfer, ambulate, lift, carry, reach, complete ADLs        With   [x] TE   [] TA   [x] neuro   [] other: Patient Education: [x] Review HEP    [] Progressed/Changed HEP based on:   [x] positioning   [x] body mechanics   [] transfers   [x] heat/ice application    [] other:      Other Objective/Functional Measures:   Pt able to hold NBOS with with EC on 2/2 trials    Pain Level (0-10 scale) post treatment: 5    ASSESSMENT/Changes in Function:     Patient will continue to benefit from skilled PT services to modify and progress therapeutic interventions, address functional mobility deficits, address ROM deficits, address strength deficits, analyze and address soft tissue restrictions, analyze and cue movement patterns, analyze and modify body mechanics/ergonomics and assess and modify postural abnormalities to attain remaining goals. []  See Plan of Care  []  See progress note/recertification  []  See Discharge Summary         Progress towards goals / Updated goals:  Patient continues to require verbal cues to complete exercises with correct form and postural awareness. Patient was able to advance several exercises this visit and is progressing well towards goals.     PLAN  [x]  Upgrade activities as tolerated     [x]  Continue plan of care  [x]  Update interventions per flow sheet       []  Discharge due to:_  []  Other:_      Steve Factor, PT 9/7/2017  11:00 AM

## 2017-09-11 ENCOUNTER — HOSPITAL ENCOUNTER (OUTPATIENT)
Dept: PHYSICAL THERAPY | Age: 77
Discharge: HOME | End: 2017-09-11
Payer: MEDICARE

## 2017-09-11 PROCEDURE — 97112 NEUROMUSCULAR REEDUCATION: CPT | Performed by: PHYSICAL THERAPIST

## 2017-09-11 PROCEDURE — 97110 THERAPEUTIC EXERCISES: CPT | Performed by: PHYSICAL THERAPIST

## 2017-09-11 NOTE — PROGRESS NOTES
PT DAILY TREATMENT NOTE - [facility] 2-15    Patient Name: Zoila Cervantes  Date:2017  : 1940  [x]  Patient  Verified  Payor: Ethan Rodriguez / Plan: [place] MEDICARE PART A & B / Product Type: Medicare /    In time:12:30 PM  Out time: 1:30 AM  Total Treatment Time (min): 60  Total Timed Codes (min): 60  1:1 Treatment Time ( [address] only): 35  Visit #: 7    Treatment Area:  Other abnormalities of gait and mobility [R26.89]    SUBJECTIVE  Pain Level (0-10 scale): 2-3  Any medication changes, allergies to medications, adverse drug reactions, diagnosis change, or new procedure performed?: [x] No    [] Yes (see summary sheet for update)  Subjective functional status/changes:   [] No changes reported  Pt reports poor compliance with HEP due to travelling this past weekend    OBJECTIVE    50 min Therapeutic Exercise:  [x] See flow sheet :   Rationale: increase ROM and increase strength to improve the patients ability to sit, stand, transfer, ambulate, lift, carry, reach, complete ADLs    10 min Neuromuscular Re-education:  [x]  See flow sheet :   Rationale: improve coordination, improve balance and increase proprioception  to improve the patients ability to sit, stand, transfer, ambulate, lift, carry, reach, complete ADLs        With   [x] TE   [] TA   [x] neuro   [] other: Patient Education: [x] Review HEP    [] Progressed/Changed HEP based on:   [x] positioning   [x] body mechanics   [] transfers   [x] heat/ice application    [] other:      Other Objective/Functional Measures:   Pt unable to hold NBOS EC without UE assist  Pt unable to hold Heel to GT EO without UE assist  Pt wearing TOMS this visit and encouraged to wear more supportive shoes next time    Pain Level (0-10 scale) post treatment: 2-3    ASSESSMENT/Changes in Function:     Patient will continue to benefit from skilled PT services to modify and progress therapeutic interventions, address functional mobility deficits, address ROM deficits, address strength deficits, analyze and address soft tissue restrictions, analyze and cue movement patterns, analyze and modify body mechanics/ergonomics and assess and modify postural abnormalities to attain remaining goals. []  See Plan of Care  []  See progress note/recertification  []  See Discharge Summary         Progress towards goals / Updated goals:  Patient continues to require verbal cues to complete exercises with correct form and postural awareness. Patient was able to advance several exercises this visit and is progressing well towards goals.     PLAN  [x]  Upgrade activities as tolerated     [x]  Continue plan of care  [x]  Update interventions per flow sheet       []  Discharge due to:_  []  Other:_      Steve Factor, PT 9/11/2017  11:00 AM

## 2017-09-14 ENCOUNTER — HOSPITAL ENCOUNTER (OUTPATIENT)
Dept: PHYSICAL THERAPY | Age: 77
Discharge: HOME | End: 2017-09-14
Payer: MEDICARE

## 2017-09-14 PROCEDURE — 97112 NEUROMUSCULAR REEDUCATION: CPT | Performed by: PHYSICAL THERAPIST

## 2017-09-14 PROCEDURE — 97110 THERAPEUTIC EXERCISES: CPT | Performed by: PHYSICAL THERAPIST

## 2017-09-14 NOTE — PROGRESS NOTES
PT DAILY TREATMENT NOTE - [facility] 2-15    Patient Name: Laila Daley  Date:2017  : 1940  [x]  Patient  Verified  Payor: Vernell Gonzalez / Plan: [place] MEDICARE PART A & B / Product Type: Medicare /    In time:4:00 PM  Out time: 5:00 PM  Total Treatment Time (min): 60  Total Timed Codes (min): 60  1:1 Treatment Time ( [address] only): 60  Visit #: 8    Treatment Area: Other abnormalities of gait and mobility [R26.89]    SUBJECTIVE  Pain Level (0-10 scale): 3  Any medication changes, allergies to medications, adverse drug reactions, diagnosis change, or new procedure performed?: [x] No    [] Yes (see summary sheet for update)  Subjective functional status/changes:   [x] No changes reported    OBJECTIVE    50 min Therapeutic Exercise:  [x] See flow sheet :   Rationale: increase ROM and increase strength to improve the patients ability to sit, stand, transfer, ambulate, lift, carry, reach, complete ADLs    10 min Neuromuscular Re-education:  [x]  See flow sheet :   Rationale: improve coordination, improve balance and increase proprioception  to improve the patients ability to sit, stand, transfer, ambulate, lift, carry, reach, complete ADLs        With   [x] TE   [] TA   [x] neuro   [] other: Patient Education: [x] Review HEP    [] Progressed/Changed HEP based on:   [x] positioning   [x] body mechanics   [] transfers   [x] heat/ice application    [] other:      Other Objective/Functional Measures:     Pain Level (0-10 scale) post treatment: 2    ASSESSMENT/Changes in Function:     Patient will continue to benefit from skilled PT services to modify and progress therapeutic interventions, address functional mobility deficits, address ROM deficits, address strength deficits, analyze and address soft tissue restrictions, analyze and cue movement patterns, analyze and modify body mechanics/ergonomics and assess and modify postural abnormalities to attain remaining goals.      []  See Plan of Care  []  See progress note/recertification  []  See Discharge Summary         Progress towards goals / Updated goals:  Patient continues to require verbal cues to complete exercises with correct form and postural awareness. Patient was able to advance several exercises this visit and is progressing well towards goals. PLAN  [x]  Upgrade activities as tolerated     [x]  Continue plan of care  [x]  Update interventions per flow sheet       []  Discharge due to:_  []  Other:_      Aamir Nose, PT , DPT, OCS, Cert.  DN   9/14/2017  11:00 AM

## 2017-09-18 ENCOUNTER — HOSPITAL ENCOUNTER (OUTPATIENT)
Dept: PHYSICAL THERAPY | Age: 77
Discharge: HOME | End: 2017-09-18
Payer: MEDICARE

## 2017-09-18 PROCEDURE — 97110 THERAPEUTIC EXERCISES: CPT | Performed by: PHYSICAL THERAPIST

## 2017-09-18 PROCEDURE — 97112 NEUROMUSCULAR REEDUCATION: CPT | Performed by: PHYSICAL THERAPIST

## 2017-09-18 NOTE — PROGRESS NOTES
PT DAILY TREATMENT NOTE - [facility] 2-15    Patient Name: Mary Eric  Date:2017  : 1940  [x]  Patient  Verified  Payor: Cristiane Kenyon / Plan: [place] MEDICARE PART A & B / Product Type: Medicare /    In time:12:30 PM  Out time: 1:30 PM  Total Treatment Time (min): 60  Total Timed Codes (min): 60  1:1 Treatment Time ( [address] only): 60  Visit #: 9    Treatment Area: Other abnormalities of gait and mobility [R26.89]    SUBJECTIVE  Pain Level (0-10 scale): 3  Any medication changes, allergies to medications, adverse drug reactions, diagnosis change, or new procedure performed?: [x] No    [] Yes (see summary sheet for update)  Subjective functional status/changes:   [] No changes reported  Patient reports that he feels more confident with transfers, but still needs to use his cane when walking.     OBJECTIVE    50 min Therapeutic Exercise:  [x] See flow sheet :   Rationale: increase ROM and increase strength to improve the patients ability to sit, stand, transfer, ambulate, lift, carry, reach, complete ADLs    10 min Neuromuscular Re-education:  [x]  See flow sheet :   Rationale: improve coordination, improve balance and increase proprioception  to improve the patients ability to sit, stand, transfer, ambulate, lift, carry, reach, complete ADLs        With   [x] TE   [] TA   [x] neuro   [] other: Patient Education: [x] Review HEP    [] Progressed/Changed HEP based on:   [x] positioning   [x] body mechanics   [] transfers   [x] heat/ice application    [] other:      Other Objective/Functional Measures:     Pain Level (0-10 scale) post treatment: 2    ASSESSMENT/Changes in Function:     Patient will continue to benefit from skilled PT services to modify and progress therapeutic interventions, address functional mobility deficits, address ROM deficits, address strength deficits, analyze and address soft tissue restrictions, analyze and cue movement patterns, analyze and modify body mechanics/ergonomics and assess and modify postural abnormalities to attain remaining goals. []  See Plan of Care  []  See progress note/recertification  []  See Discharge Summary         Progress towards goals / Updated goals:  Patient tolerated today's session well and demonstrates improved confidence with WOBS with EC. Will continue to progress neuro re-ed to allow for decrease in fall risk and achievement of STG#2. PLAN  [x]  Upgrade activities as tolerated     [x]  Continue plan of care  [x]  Update interventions per flow sheet       []  Discharge due to:_  []  Other:_      Keith Overton, PT , DPT, OCS, Cert.  DN   9/18/2017  11:00 AM

## 2017-09-21 ENCOUNTER — HOSPITAL ENCOUNTER (OUTPATIENT)
Dept: PHYSICAL THERAPY | Age: 77
Discharge: HOME | End: 2017-09-21
Payer: MEDICARE

## 2017-09-21 ENCOUNTER — APPOINTMENT (OUTPATIENT)
Dept: PHYSICAL THERAPY | Age: 77
End: 2017-09-21
Payer: MEDICARE

## 2017-09-21 PROCEDURE — 97112 NEUROMUSCULAR REEDUCATION: CPT | Performed by: PHYSICAL THERAPIST

## 2017-09-21 PROCEDURE — 97110 THERAPEUTIC EXERCISES: CPT | Performed by: PHYSICAL THERAPIST

## 2017-09-21 NOTE — PROGRESS NOTES
PT DAILY TREATMENT NOTE - [facility] 2-15    Patient Name: Angel Magana  Date:2017  : 1940  [x]  Patient  Verified  Payor: Selin Barroso / Plan: [place] MEDICARE PART A & B / Product Type: Medicare /    In time:9:00 AM  Out time: 10:00 AM  Total Treatment Time (min): 60  Total Timed Codes (min): 60  1:1 Treatment Time ( [address] only): 30  Visit #: 10    Treatment Area: Other abnormalities of gait and mobility [R26.89]    SUBJECTIVE  Pain Level (0-10 scale): 2  Any medication changes, allergies to medications, adverse drug reactions, diagnosis change, or new procedure performed?: [x] No    [] Yes (see summary sheet for update)  Subjective functional status/changes:   [] No changes reported  Patient reports that he feels stronger than when he started PT and is happy with his progress.     OBJECTIVE    50 min Therapeutic Exercise:  [x] See flow sheet :   Rationale: increase ROM and increase strength to improve the patients ability to sit, stand, transfer, ambulate, lift, carry, reach, complete ADLs    10 min Neuromuscular Re-education:  [x]  See flow sheet :   Rationale: improve coordination, improve balance and increase proprioception  to improve the patients ability to sit, stand, transfer, ambulate, lift, carry, reach, complete ADLs        With   [x] TE   [] TA   [x] neuro   [] other: Patient Education: [x] Review HEP    [] Progressed/Changed HEP based on:   [x] positioning   [x] body mechanics   [] transfers   [x] heat/ice application    [] other:      Other Objective/Functional Measures:     Pain Level (0-10 scale) post treatment: 2    ASSESSMENT/Changes in Function:     Patient will continue to benefit from skilled PT services to modify and progress therapeutic interventions, address functional mobility deficits, address ROM deficits, address strength deficits, analyze and address soft tissue restrictions, analyze and cue movement patterns, analyze and modify body mechanics/ergonomics and assess and modify postural abnormalities to attain remaining goals. []  See Plan of Care  []  See progress note/recertification  []  See Discharge Summary         Progress towards goals / Updated goals:  Patient continues to do well with PT, with patient reports of improved muscular strength and endurance. Will re-evaluate patient next visit. PLAN  [x]  Upgrade activities as tolerated     [x]  Continue plan of care  [x]  Update interventions per flow sheet       []  Discharge due to:_  []  Other:_      Keith Overton, PT , DPT, OCS, Cert.  DN   9/21/2017  11:00 AM

## 2017-09-25 ENCOUNTER — HOSPITAL ENCOUNTER (OUTPATIENT)
Dept: PHYSICAL THERAPY | Age: 77
Discharge: HOME | End: 2017-09-25
Payer: MEDICARE

## 2017-09-25 PROCEDURE — 97112 NEUROMUSCULAR REEDUCATION: CPT | Performed by: PHYSICAL THERAPIST

## 2017-09-25 PROCEDURE — 97110 THERAPEUTIC EXERCISES: CPT | Performed by: PHYSICAL THERAPIST

## 2017-09-25 PROCEDURE — G8978 MOBILITY CURRENT STATUS: HCPCS | Performed by: PHYSICAL THERAPIST

## 2017-09-25 PROCEDURE — G8979 MOBILITY GOAL STATUS: HCPCS | Performed by: PHYSICAL THERAPIST

## 2017-09-25 NOTE — PROGRESS NOTES
PT DAILY TREATMENT NOTE - [facility] 2-15    Patient Name: Viviana Thibodeaux  Date:2017  : 1940  [x]  Patient  Verified  Payor: Aura Pratter / Plan: [place] MEDICARE PART A & B / Product Type: Medicare /    In time: 11:30 AM  Out time: 12:30 PM  Total Treatment Time (min): 60  Total Timed Codes (min): 60  1:1 Treatment Time ( [address] only): 40  Visit #: 11    Treatment Area: Other abnormalities of gait and mobility [R26.89]    SUBJECTIVE  Pain Level (0-10 scale):  2  Any medication changes, allergies to medications, adverse drug reactions, diagnosis change, or new procedure performed?: [x] No    [] Yes (see summary sheet for update)  Subjective functional status/changes:   [] No changes reported  Pt reports he feels more steady in the shower    OBJECTIVE    50 min Therapeutic Exercise:  [x] See flow sheet :   Rationale: increase ROM and increase strength to improve the patients ability to sit, stand, transfer, ambulate, lift, carry, reach, complete ADLs    10 min Neuromuscular Re-education:  [x]  See flow sheet :   Rationale: improve coordination, improve balance and increase proprioception  to improve the patients ability to sit, stand, transfer, ambulate, lift, carry, reach, complete ADLs        With   [x] TE   [] TA   [x] neuro   [] other: Patient Education: [x] Review HEP    [] Progressed/Changed HEP based on:   [x] positioning   [x] body mechanics   [] transfers   [x] heat/ice application    [] other:      Other Objective/Functional Measures:   NBOS 30 seconds 1/3 times without LOB  Pt able to negotiate 4 steps with 2 railings   MMT:  Hip flexors 4/5  Knee extensors 5/5  Knee flexors 4+/5  Ankle DF R 4/5 L 4-/5  Ankle PF 5/5  Pt able to t/f sit to stand without UE assist  TU seconds with SPC    Pain Level (0-10 scale) post treatment: 2    ASSESSMENT/Changes in Function:     Patient will continue to benefit from skilled PT services to modify and progress therapeutic interventions, address functional mobility deficits, address ROM deficits, address strength deficits, analyze and address soft tissue restrictions, analyze and cue movement patterns, analyze and modify body mechanics/ergonomics and assess and modify postural abnormalities to attain remaining goals. []  See Plan of Care  [x]  See progress note/recertification  []  See Discharge Summary         Progress towards goals / Updated goals:  Patient continues to require verbal cues to complete exercises with correct form and postural awareness. Patient was able to advance several exercises this visit and is progressing well towards goals.     PLAN  [x]  Upgrade activities as tolerated     [x]  Continue plan of care  [x]  Update interventions per flow sheet       []  Discharge due to:_  []  Other:_      Nicoletta Cushing, PT , DPT   9/25/2017  11:30 AM

## 2017-09-25 NOTE — PROGRESS NOTES
[facility]  [place]  [facility] [address]  Phone: [phone]      Fax:  [phone]    Progress Note    Name: Geovani Doan   : 1940   MD: Ani Small MD       Treatment Diagnosis: Other abnormalities of gait and mobility [R26.89]  Start of Care: 17    Visits from Start of Care: 11  Missed Visits: 0    Summary of Care: Therapeutic Exercise, Neuromuscular Re-education, Patient Education, Home Exercise Program     Assessment / Recommendations: The patient has completed 11 visits and has made significant gains in static balance and demonstrates improved activity tolerance. The patient continues to be limited by weakness, affecting his ability to perform household chores. The patient has met all short term goals and would benefit from continued PT to reach long term goals. Short Term Goals: To be accomplished in 4 weeks:  1) The patient will be independent with introductory HEP  Status at last note/certification: not met  Status at progress note: met  2) The patient will demonstrate negative Romberg test to indicate decreased risk of falls  Status at last note/certification: not met  Status at progress note: not met  3) The patient will demonstrate ability to transfer sit to stand without UE assist  Status at last note/certification: not met  Status at progress note: met  Long Term Goals:  To be accomplished in 12 weeks:  1) The patient will complete TUG test in 15 seconds or less to indicate improved functional mobility  Status at last note/certification: not met  Status at progress note: not met  2) The patient will negotiate 12 steps reciprocally with 1 railing, without LOB  Status at last note/certification: not met  Status at progress note: not met  3) The patient will demonstrate 4+/5 BLE strength to improve ease with household chores  Status at last note/certification: not met  Status at progress note: not met  G-Codes (GP)  Mobility  [id number] Current  CK= 40-59%  [id number] Goal  CK= 40-59%    The severity rating is based on the FOTO Score score. Nicoletta Cushing, PT 9/25/2017 11:36 AM    ________________________________________________________________________  NOTE TO PHYSICIAN:  Please complete the following and fax to: [facility]: Fax: [phone]. Reyna Marr Retain this original for your records. If you are unable to process this request in 24 hours, please contact our office.        ____ I have read the above report and request that my patient continue therapy with the following changes/special instructions:  ____ I have read the above report and request that my patient be discharged from therapy    Physician's Signature:_________________ Date:___________Time:__________

## 2017-09-29 ENCOUNTER — APPOINTMENT (OUTPATIENT)
Dept: PHYSICAL THERAPY | Age: 77
End: 2017-09-29
Payer: MEDICARE

## 2017-10-02 ENCOUNTER — HOSPITAL ENCOUNTER (OUTPATIENT)
Dept: PHYSICAL THERAPY | Age: 77
Discharge: HOME | End: 2017-10-02
Payer: MEDICARE

## 2017-10-02 PROCEDURE — 97110 THERAPEUTIC EXERCISES: CPT | Performed by: PHYSICAL THERAPIST

## 2017-10-02 PROCEDURE — 97112 NEUROMUSCULAR REEDUCATION: CPT | Performed by: PHYSICAL THERAPIST

## 2017-10-02 NOTE — PROGRESS NOTES
PT DAILY TREATMENT NOTE - [facility] 2-15    Patient Name: Amita Orlando  Date:10/2/2017  : 1940  [x]  Patient  Verified  Payor: Linder Cheadle / Plan: [place] MEDICARE PART A & B / Product Type: Medicare /    In time: 11:50 AM  Out time: 1:10 PM  Total Treatment Time (min): 80  Total Timed Codes (min): 60  1:1 Treatment Time ( [address] only): 25  Visit #: 12    Treatment Area:  Other abnormalities of gait and mobility [R26.89]    SUBJECTIVE  Pain Level (0-10 scale): 2-310  Any medication changes, allergies to medications, adverse drug reactions, diagnosis change, or new procedure performed?: [x] No    [] Yes (see summary sheet for update)  Subjective functional status/changes:   [] No changes reported  Pt reports his knee pain is about the same as it normally is    OBJECTIVE    50 min Therapeutic Exercise:  [x] See flow sheet :   Rationale: increase ROM and increase strength to improve the patients ability to sit, stand, transfer, ambulate, lift, carry, reach, complete ADLs    10 min Neuromuscular Re-education:  [x]  See flow sheet :   Rationale: improve coordination, improve balance and increase proprioception  to improve the patients ability to sit, stand, transfer, ambulate, lift, carry, reach, complete ADLs        With   [x] TE   [] TA   [x] neuro   [] other: Patient Education: [x] Review HEP    [] Progressed/Changed HEP based on:   [x] positioning   [x] body mechanics   [] transfers   [x] heat/ice application    [] other:      Other Objective/Functional Measures:   Pt unable to hold NBOS with EC 30 seconds    Pain Level (0-10 scale) post treatment: 0    ASSESSMENT/Changes in Function:     Patient will continue to benefit from skilled PT services to modify and progress therapeutic interventions, address functional mobility deficits, address ROM deficits, address strength deficits, analyze and address soft tissue restrictions, analyze and cue movement patterns, analyze and modify body mechanics/ergonomics and assess and modify postural abnormalities to attain remaining goals. []  See Plan of Care  [x]  See progress note/recertification  []  See Discharge Summary         Progress towards goals / Updated goals:  Patient continues to require verbal cues to complete exercises with correct form and postural awareness. Patient was able to advance several exercises this visit and is progressing well towards goals.     PLAN  [x]  Upgrade activities as tolerated     [x]  Continue plan of care  [x]  Update interventions per flow sheet       []  Discharge due to:_  []  Other:_      Carla Reddy PT , DPT   10/2/2017  11:50 AM

## 2017-10-06 ENCOUNTER — APPOINTMENT (OUTPATIENT)
Dept: PHYSICAL THERAPY | Age: 77
End: 2017-10-06
Payer: MEDICARE

## 2017-10-12 ENCOUNTER — APPOINTMENT (OUTPATIENT)
Dept: PHYSICAL THERAPY | Age: 77
End: 2017-10-12
Payer: MEDICARE

## 2017-10-17 ENCOUNTER — HOSPITAL ENCOUNTER (OUTPATIENT)
Dept: PHYSICAL THERAPY | Age: 77
Discharge: HOME | End: 2017-10-17
Payer: MEDICARE

## 2017-10-17 ENCOUNTER — OFFICE VISIT (OUTPATIENT)
Dept: NEUROLOGY | Age: 77
End: 2017-10-17

## 2017-10-17 VITALS
BODY MASS INDEX: 37.1 KG/M2 | SYSTOLIC BLOOD PRESSURE: 140 MMHG | WEIGHT: 265 LBS | DIASTOLIC BLOOD PRESSURE: 92 MMHG | HEIGHT: 71 IN

## 2017-10-17 DIAGNOSIS — F02.80 LATE ONSET ALZHEIMER'S DISEASE WITHOUT BEHAVIORAL DISTURBANCE (HCC): Primary | ICD-10-CM

## 2017-10-17 DIAGNOSIS — G30.1 LATE ONSET ALZHEIMER'S DISEASE WITHOUT BEHAVIORAL DISTURBANCE (HCC): Primary | ICD-10-CM

## 2017-10-17 DIAGNOSIS — R26.9 GAIT DISORDER: ICD-10-CM

## 2017-10-17 PROCEDURE — 97112 NEUROMUSCULAR REEDUCATION: CPT | Performed by: PHYSICAL THERAPIST

## 2017-10-17 PROCEDURE — 97110 THERAPEUTIC EXERCISES: CPT | Performed by: PHYSICAL THERAPIST

## 2017-10-17 RX ORDER — RISPERIDONE 0.5 MG/1
0.5 TABLET, FILM COATED ORAL
Qty: 90 TAB | Refills: 1 | Status: SHIPPED | OUTPATIENT
Start: 2017-10-17 | End: 2018-02-21 | Stop reason: SDUPTHER

## 2017-10-17 RX ORDER — ESCITALOPRAM OXALATE 20 MG/1
TABLET ORAL
Qty: 135 TAB | Refills: 1 | Status: SHIPPED | OUTPATIENT
Start: 2017-10-17 | End: 2018-02-21 | Stop reason: SDUPTHER

## 2017-10-17 NOTE — PATIENT INSTRUCTIONS
[facility]   Statement to Patients  April 1, 2014      In an effort to ensure the large volume of patient prescription refills is processed in the most efficient and expeditious manner, we are asking our patients to assist us by calling your Pharmacy for all prescription refills, this will include also your  Mail Order Pharmacy. The pharmacy will contact our office electronically to continue the refill process. Please do not wait until the last minute to call your pharmacy. We need at least 48 hours (2days) to fill prescriptions. We also encourage you to call your pharmacy before going to  your prescription to make sure it is ready. With regard to controlled substance prescription refill requests (narcotic refills) that need to be picked up at our office, we ask your cooperation by providing us with at least 72 hours (3days) notice that you will need a refill. We will not refill narcotic prescription refill requests after 4:00pm on any weekday, Monday through Thursday, or after 2:00pm on Fridays, or on the weekends. We encourage everyone to explore another way of getting your prescription refill request processed using [employer], our patient web portal through our electronic medical record system. [employer] is an efficient and effective way to communicate your medication request directly to the office and  downloadable as an francisco on your smart phone . [employer] also features a review functionality that allows you to view your medication list as well as leave messages for your physician. Are you ready to get connected? If so please review the attatched instructions or speak to any of our staff to get you set up right away! Thank you so much for your cooperation. Should you have any questions please contact our Practice Administrator. The Physicians and Staff,  [facility] [id number] [address]  What is a living will?   A living will is a legal form you use to write down the kind of care you want at the end of your life. It is used by the health professionals who will treat you if you aren't able to decide for yourself. If you put your wishes in writing, your loved ones and others will know what kind of care you want. They won't need to guess. This can ease your mind and be helpful to others. A living will is not the same as an estate or property will. An estate will explains what you want to happen with your money and property after you die. Is a living will a legal document? A living will is a legal document. Each state has its own laws about living ordaz. If you move to another state, make sure that your living will is legal in the state where you now live. Or you might use a universal form that has been approved by many states. This kind of form can sometimes be completed and stored online. Your electronic copy will then be available wherever you have a connection to the Internet. In most cases, doctors will respect your wishes even if you have a form from a different state. · You don't need an  to complete a living will. But legal advice can be helpful if your state's laws are unclear, your health history is complicated, or your family can't agree on what should be in your living will. · You can change your living will at any time. Some people find that their wishes about end-of-life care change as their health changes. · In addition to making a living will, think about completing a medical power of  form. This form lets you name the person you want to make end-of-life treatment decisions for you (your \"health care agent\") if you're not able to. Many hospitals and nursing homes will give you the forms you need to complete a living will and a medical power of . · Your living will is used only if you can't make or communicate decisions for yourself anymore.  If you become able to make decisions again, you can accept or refuse any treatment, no matter what you wrote in your living will. · Your state may offer an online registry. This is a place where you can store your living will online so the doctors and nurses who need to treat you can find it right away. What should you think about when creating a living will? Talk about your end-of-life wishes with your family members and your doctor. Let them know what you want. That way the people making decisions for you won't be surprised by your choices. Think about these questions as you make your living will:  · Do you know enough about life support methods that might be used? If not, talk to your doctor so you know what might be done if you can't breathe on your own, your heart stops, or you're unable to swallow. · What things would you still want to be able to do after you receive life-support methods? Would you want to be able to walk? To speak? To eat on your own? To live without the help of machines? · If you have a choice, where do you want to be cared for? In your home? At a hospital or nursing home? · Do you want certain Congregational practices performed if you become very ill? · If you have a choice at the end of your life, where would you prefer to die? At home? In a hospital or nursing home? Somewhere else? · Would you prefer to be buried or cremated? · Do you want your organs to be donated after you die? What should you do with your living will? · Make sure that your family members and your health care agent have copies of your living will. · Give your doctor a copy of your living will to keep in your medical record. If you have more than one doctor, make sure that each one has a copy. · You may want to put a copy of your living will where it can be easily found. Where can you learn more? Go to http://saira-gogo.info/. Enter P546 in the search box to learn more about \"Learning About Living Per[place]a. \"  Current as of: August 8, 2016  Content Version: 11.3  © 4837-1367 [employer]. Care instructions adapted under license by [employer] (which disclaims liability or warranty for this information). If you have questions about a medical condition or this instruction, always ask your healthcare professional. [facility] any warranty or liability for your use of this information. Advance Directives: Care Instructions  Your Care Instructions  An advance directive is a legal way to state your wishes at the end of your life. It tells your family and your doctor what to do if you can no longer say what you want. There are two main types of advance directives. You can change them any time that your wishes change. · A living will tells your family and your doctor your wishes about life support and other treatment. · A durable power of  for health care lets you name a person to make treatment decisions for you when you can't speak for yourself. This person is called a health care agent. If you do not have an advance directive, decisions about your medical care may be made by a doctor or a  who doesn't know you. It may help to think of an advance directive as a gift to the people who care for you. If you have one, they won't have to make tough decisions by themselves. Follow-up care is a key part of your treatment and safety. Be sure to make and go to all appointments, and call your doctor if you are having problems. It's also a good idea to know your test results and keep a list of the medicines you take. How can you care for yourself at home? · Discuss your wishes with your loved ones and your doctor. This way, there are no surprises. · Many states have a unique form. Or you might use a universal form that has been approved by many states. This kind of form can sometimes be completed and stored online.  Your electronic copy will then be available wherever you have a connection to the Internet. In most cases, doctors will respect your wishes even if you have a form from a different state. · You don't need a  to do an advance directive. But you may want to get legal advice. · Think about these questions when you prepare an advance directive:  ¨ Who do you want to make decisions about your medical care if you are not able to? Many people choose a family member or close friend. ¨ Do you know enough about life support methods that might be used? If not, talk to your doctor so you understand. ¨ What are you most afraid of that might happen? You might be afraid of having pain, losing your independence, or being kept alive by machines. ¨ Where would you prefer to die? Choices include your home, a hospital, or a nursing home. ¨ Would you like to have information about hospice care to support you and your family? ¨ Do you want to donate organs when you die? ¨ Do you want certain [place]ze practices performed before you die? If so, put your wishes in the advance directive. · Read your advance directive every year, and make changes as needed. When should you call for help? Be sure to contact your doctor if you have any questions. Where can you learn more? Go to http://saira-gogo.info/. Enter R264 in the search box to learn more about \"Advance Directives: Care Instructions. \"  Current as of: November 17, 2016  Content Version: 11.3  © 1686-3540 [employer]. Care instructions adapted under license by [employer] (which disclaims liability or warranty for this information). If you have questions about a medical condition or this instruction, always ask your healthcare professional. [address] any warranty or liability for your use of this information.

## 2017-10-17 NOTE — MR AVS SNAPSHOT
Visit Information Date & Time Provider Department Dept. Phone Encounter #  
 10/17/2017 11:30 AM JOELLE Vizcaino Neurology [facility] [phone] [id number] Follow-up Instructions Return in about 2 months (around 12/17/2017). Upcoming Health Maintenance Date Due DTaP/Tdap/Td series (1 - Tdap) 10/9/1960 ZOSTER VACCINE AGE 60> 8/9/1999 GLAUCOMA SCREENING Q2Y 10/9/2004 Pneumococcal 65+ Low/Medium Risk (1 of 2 - PCV13) 10/9/2004 MEDICARE YEARLY EXAM 10/9/2004 INFLUENZA AGE 9 TO ADULT 8/1/2017 Allergies as of 10/17/2017  Review Complete On: 10/17/2017 By: Gerard Samuel Severity Noted Reaction Type Reactions Atenolol  01/06/2016    Unknown (comments) Dilaudid [Hydromorphone]  01/06/2016    Unknown (comments) Lisinopril  01/06/2016    Unknown (comments) Oxycodone  01/06/2016    Unknown (comments) Current Immunizations  Never Reviewed No immunizations on file. Not reviewed this visit You Were Diagnosed With   
  
 Codes Comments Late onset Alzheimer's disease without behavioral disturbance    -  Primary ICD-10-CM: G30.1, F02.80 ICD-9-CM: 331.0, 294.10 Gait disorder     ICD-10-CM: R26.9 ICD-9-CM: 691. 2 Vitals BP Height(growth percentile) Weight(growth percentile) BMI Smoking Status (!) 140/92 5' 11\" (1.803 m) 265 lb (120.2 kg) 36.96 kg/m2 Former Smoker Vitals History BMI and BSA Data Body Mass Index Body Surface Area  
 36.96 kg/m 2 2.45 m 2 Preferred Pharmacy Pharmacy Name Phone [address] [facility] [phone] Your Updated Medication List  
  
   
This list is accurate as of: 10/17/17 12:21 PM.  Always use your most recent med list.  
  
  
  
  
 allopurinol 300 mg tablet Commonly known as:  ZYLOPRIM  
  
 amLODIPine 5 mg tablet Commonly known as:  NORVASC  
  
 cholecalciferol 1,000 unit Cap Commonly known as:  VITAMIN D3 Take  by mouth daily. escitalopram oxalate 20 mg tablet Commonly known as:  Agnes Galmaria c Take 1.5 tablets by mouth daily  
  
 fexofenadine 180 mg tablet Commonly known as:  Ammy Mix Take  by mouth. glipiZIDE SR 5 mg CR tablet Commonly known as:  GLUCOTROL XL  
  
 hydroCHLOROthiazide 25 mg tablet Commonly known as:  HYDRODIURIL  
  
 imipramine 25 mg tablet Commonly known as:  TOFRANIL Take 25 mg by mouth three (3) times daily. memantine-donepezil 28-10 mg Cspx Commonly known as:  MOTION [facility] Take 1 Cap by mouth daily. metoprolol tartrate 25 mg tablet Commonly known as:  LOPRESSOR  
  
 risperiDONE 0.5 mg tablet Commonly known as:  RisperDAL Take 1 Tab by mouth nightly. rivaroxaban 20 mg Tab tablet Commonly known as:  Caralyn Rosemarie Take  by mouth daily. simvastatin 20 mg tablet Commonly known as:  ZOCOR SITagliptin-metFORMIN 50-1,000 mg per tablet Commonly known as:  Lavonne Legato Take 1 Tab by mouth two (2) times daily (with meals). Prescriptions Sent to Pharmacy Refills  
 risperiDONE (RISPERDAL) 0.5 mg tablet 1 Sig: Take 1 Tab by mouth nightly. Class: Normal  
 Pharmacy: [address], [address] Ph #: [phone] Route: Oral  
 escitalopram oxalate (LEXAPRO) 20 mg tablet 1 Sig: Take 1.5 tablets by mouth daily Class: Normal  
 Pharmacy: [address], [address] Ph #: [phone] We Performed the Following CBC WITH AUTOMATED DIFF [80443 CPT(R)] METABOLIC PANEL, COMPREHENSIVE [05917 CPT(R)] REFERRAL TO NEUROPSYCHOLOGY [BSV08 Custom] Comments:  
 Memory loss re test  
 THYROID PANEL W/TSH [21114 CPT(R)] VITAMIN B12 & FOLATE [35825 CPT(R)] VITAMIN D, 1, 25 DIHYDROXY [11157 CPT(R)] Follow-up Instructions Return in about 2 months (around 12/17/2017). To-Do List   
 10/19/2017 9:30 AM  
  Appointment with Mahesh Viera at [facility] ([phone]) Please remember to arrive at the hospital at least 30 minutes prior to your scheduled appointment time. When you come in for your appointment, please be sure to bring the therapy prescription the doctor gave you, your insurance card, and a list of the medicines you are taking. Also, please remember to wear comfortable, loose- fitting clothes. We look forward to seeing you. 10/23/2017 12:30 PM  
  Appointment with [address] [place] [address] at [facility] [address] ([phone]) Please remember to arrive at the hospital at least 30 minutes prior to your scheduled appointment time. When you come in for your appointment, please be sure to bring the therapy prescription the doctor gave you, your insurance card, and a list of the medicines you are taking. Also, please remember to wear comfortable, loose- fitting clothes. We look forward to seeing you. 10/25/2017 1:00 PM  
  Appointment with [address] [place] [address] at [facility] [address] ([phone]) Please remember to arrive at the hospital at least 30 minutes prior to your scheduled appointment time. When you come in for your appointment, please be sure to bring the therapy prescription the doctor gave you, your insurance card, and a list of the medicines you are taking. Also, please remember to wear comfortable, loose- fitting clothes. We look forward to seeing you. 10/30/2017 12:30 PM  
  Appointment with [address] [place] [address] at [facility] [address] ([phone]) Please remember to arrive at the hospital at least 30 minutes prior to your scheduled appointment time. When you come in for your appointment, please be sure to bring the therapy prescription the doctor gave you, your insurance card, and a list of the medicines you are taking. Also, please remember to wear comfortable, loose- fitting clothes. We look forward to seeing you. 11/03/2017 11:00 AM  
  Appointment with Compa Rodriguez at [facility] [place] ([phone]) Please remember to arrive at the hospital at least 30 minutes prior to your scheduled appointment time. When you come in for your appointment, please be sure to bring the therapy prescription the doctor gave you, your insurance card, and a list of the medicines you are taking. Also, please remember to wear comfortable, loose- fitting clothes. We look forward to seeing you. Referral Information Referral ID Referred By Referred To  
  
 [id number] [place] [address] [place] [address] [facility] [address] [facility], [address] [facility] Phone: [phone] Fax: [phone] Visits Status Start Date End Date 1 New Request 10/17/17 10/17/18 If your referral has a status of pending review or denied, additional information will be sent to support the outcome of this decision. Patient Instructions PRESCRIPTION REFILL POLICY [facility] Statement to Patients April 1, 2014 In an effort to ensure the large volume of patient prescription refills is processed in the most efficient and expeditious manner, we are asking our patients to assist us by calling your Pharmacy for all prescription refills, this will include also your  Mail Order Pharmacy. The pharmacy will contact our office electronically to continue the refill process. Please do not wait until the last minute to call your pharmacy. We need at least 48 hours (2days) to fill prescriptions. We also encourage you to call your pharmacy before going to  your prescription to make sure it is ready. With regard to controlled substance prescription refill requests (narcotic refills) that need to be picked up at our office, we ask your cooperation by providing us with at least 72 hours (3days) notice that you will need a refill. We will not refill narcotic prescription refill requests after 4:00pm on any weekday, Monday through Thursday, or after 2:00pm on Fridays, or on the weekends. We encourage everyone to explore another way of getting your prescription refill request processed using [employer], our patient web portal through our electronic medical record system. [employer] is an efficient and effective way to communicate your medication request directly to the office and  downloadable as an francisco on your smart phone . [employer] also features a review functionality that allows you to view your medication list as well as leave messages for your physician. Are you ready to get connected? If so please review the attatched instructions or speak to any of our staff to get you set up right away! Thank you so much for your cooperation. Should you have any questions please contact our Practice Administrator. The Physicians and Staff,  [facility] Sarah Welsh [address] What is a living will? A living will is a legal form you use to write down the kind of care you want at the end of your life. It is used by the health professionals who will treat you if you aren't able to decide for yourself. If you put your wishes in writing, your loved ones and others will know what kind of care you want. They won't need to guess. This can ease your mind and be helpful to others. A living will is not the same as an estate or property will. An estate will explains what you want to happen with your money and property after you die. Is a living will a legal document? A living will is a legal document. Each state has its own laws about living ordaz. If you move to another state, make sure that your living will is legal in the state where you now live. Or you might use a universal form that has been approved by many states. This kind of form can sometimes be completed and stored online.  Your electronic copy will then be available wherever you have a connection to the Internet. In most cases, doctors will respect your wishes even if you have a form from a different state. · You don't need an  to complete a living will. But legal advice can be helpful if your state's laws are unclear, your health history is complicated, or your family can't agree on what should be in your living will. · You can change your living will at any time. Some people find that their wishes about end-of-life care change as their health changes. · In addition to making a living will, think about completing a medical power of  form. This form lets you name the person you want to make end-of-life treatment decisions for you (your \"health care agent\") if you're not able to. Many hospitals and nursing homes will give you the forms you need to complete a living will and a medical power of . · Your living will is used only if you can't make or communicate decisions for yourself anymore. If you become able to make decisions again, you can accept or refuse any treatment, no matter what you wrote in your living will. · Your state may offer an online registry. This is a place where you can store your living will online so the doctors and nurses who need to treat you can find it right away. What should you think about when creating a living will? Talk about your end-of-life wishes with your family members and your doctor. Let them know what you want. That way the people making decisions for you won't be surprised by your choices. Think about these questions as you make your living will: · Do you know enough about life support methods that might be used? If not, talk to your doctor so you know what might be done if you can't breathe on your own, your heart stops, or you're unable to swallow.  
· What things would you still want to be able to do after you receive life-support methods? Would you want to be able to walk? To speak? To eat on your own? To live without the help of machines? · If you have a choice, where do you want to be cared for? In your home? At a hospital or nursing home? · Do you want certain Tenriism practices performed if you become very ill? · If you have a choice at the end of your life, where would you prefer to die? At home? In a hospital or nursing home? Somewhere else? · Would you prefer to be buried or cremated? · Do you want your organs to be donated after you die? What should you do with your living will? · Make sure that your family members and your health care agent have copies of your living will. · Give your doctor a copy of your living will to keep in your medical record. If you have more than one doctor, make sure that each one has a copy. · You may want to put a copy of your living will where it can be easily found. Where can you learn more? Go to http://saira-gogo.info/. Enter N272 in the search box to learn more about \"Learning About Living Chyna Prasad. \" Current as of: August 8, 2016 Content Version: 11.3 © 6645-9853 SQMOS. Care instructions adapted under license by [employer] (which disclaims liability or warranty for this information). If you have questions about a medical condition or this instruction, always ask your healthcare professional. [place] [address] any warranty or liability for your use of this information. Advance Directives: Care Instructions Your Care Instructions An advance directive is a legal way to state your wishes at the end of your life. It tells your family and your doctor what to do if you can no longer say what you want. There are two main types of advance directives. You can change them any time that your wishes change. · A living will tells your family and your doctor your wishes about life support and other treatment. · A durable power of  for health care lets you name a person to make treatment decisions for you when you can't speak for yourself. This person is called a health care agent. If you do not have an advance directive, decisions about your medical care may be made by a doctor or a  who doesn't know you. It may help to think of an advance directive as a gift to the people who care for you. If you have one, they won't have to make tough decisions by themselves. Follow-up care is a key part of your treatment and safety. Be sure to make and go to all appointments, and call your doctor if you are having problems. It's also a good idea to know your test results and keep a list of the medicines you take. How can you care for yourself at home? · Discuss your wishes with your loved ones and your doctor. This way, there are no surprises. · Many states have a unique form. Or you might use a universal form that has been approved by many states. This kind of form can sometimes be completed and stored online. Your electronic copy will then be available wherever you have a connection to the Internet. In most cases, doctors will respect your wishes even if you have a form from a different state. · You don't need a  to do an advance directive. But you may want to get legal advice. · Think about these questions when you prepare an advance directive: ¨ Who do you want to make decisions about your medical care if you are not able to? Many people choose a family member or close friend. ¨ Do you know enough about life support methods that might be used? If not, talk to your doctor so you understand. ¨ What are you most afraid of that might happen? You might be afraid of having pain, losing your independence, or being kept alive by machines. ¨ Where would you prefer to die? Choices include your home, a hospital, or a nursing home.  
¨ Would you like to have information about hospice care to support you and your family? ¨ Do you want to donate organs when you die? ¨ Do you want certain Sabianist practices performed before you die? If so, put your wishes in the advance directive. · Read your advance directive every year, and make changes as needed. When should you call for help? Be sure to contact your doctor if you have any questions. Where can you learn more? Go to http://saira-gogo.info/. Enter R264 in the search box to learn more about \"Advance Directives: Care Instructions. \" Current as of: November 17, 2016 Content Version: 11.3 © 5347-6965 [email or web address]. Care instructions adapted under license by [employer] (which disclaims liability or warranty for this information). If you have questions about a medical condition or this instruction, always ask your healthcare professional. [place] [address] any warranty or liability for your use of this information. Introducing [facility]! Allyson Caldera introduces [employer] patient portal. Now you can access parts of your medical record, email your doctor's office, and request medication refills online. 1. In your internet browser, go to [email or web address] 2. Click on the First Time User? Click Here link in the Sign In box. You will see the New Member Sign Up page. 3. Enter your [employer] Access Code exactly as it appears below. You will not need to use this code after youve completed the sign-up process. If you do not sign up before the expiration date, you must request a new code. · [employer] Access Code: [email or web address] Expires: 11/14/2017  3:41 PM 
 
4. Enter the last four digits of your Social Security Number (xxxx) and Date of Birth (mm/dd/yyyy) as indicated and click Submit. You will be taken to the next sign-up page. 5. Create a [employer] ID. This will be your [employer] login ID and cannot be changed, so think of one that is secure and easy to remember. 6. Create a [employer] password. You can change your password at any time. 7. Enter your Password Reset Question and Answer. This can be used at a later time if you forget your password. 8. Enter your e-mail address. You will receive e-mail notification when new information is available in [address] 9. Click Sign Up. You can now view and download portions of your medical record. 10. Click the Download Summary menu link to download a portable copy of your medical information. If you have questions, please visit the Frequently Asked Questions section of the [employer] website. Remember, [employer] is NOT to be used for urgent needs. For medical emergencies, dial 911. Now available from your iPhone and Android! Please provide this summary of care documentation to your next provider. Your primary care clinician is listed as [address]. If you have any questions after today's visit, please call [phone].

## 2017-10-17 NOTE — PROGRESS NOTES
Note was given by wife for NP to look over. Pt is here for follow up. Patient states he has been about the same. He feels he is holding his own. Patient states he had a flare up Saturday. He was frustrated and angry lasting all day and into Sunday.

## 2017-10-17 NOTE — PROGRESS NOTES
Jcarlos Fitzgerald is a 65 y.o. male who presents with the following  Chief Complaint   Patient presents with    Follow-up       HPI Patient comes in with wife for a follow up for dementia. She brings a list of issues which we will attach to media. She states his memory is up and down. She feels like he has good days and bad days. He is very unmotivated during the day and has no energy in that he will sleep a lot and take naps during the day. Does not want to get out on a walk, or exercise, or do anything other then sleep or watch tv. He states he will read sometimes. Has no drive and states nothing interests him anymore. States he is disconneccted from the surroundings. He is sleeping better with risperdal 0.5mg nightly only waking up 1 time as opposed to 3-4 before. But still sleeping just as much during the day with litle energy level. He is on namzaric without issue. Getting PT but not doing home exercises. He feels like it is helping him though. Wife states it is not. On Lexapro 20 mg. Allergies   Allergen Reactions    Atenolol Unknown (comments)    Dilaudid [Hydromorphone] Unknown (comments)    Lisinopril Unknown (comments)    Oxycodone Unknown (comments)       Current Outpatient Prescriptions   Medication Sig    risperiDONE (RISPERDAL) 0.5 mg tablet Take 1 Tab by mouth nightly.  escitalopram oxalate (LEXAPRO) 20 mg tablet Take 1.5 tablets by mouth daily    memantine-donepezil (NAMZARIC) 28-10 mg CSpX Take 1 Cap by mouth daily.  allopurinol (ZYLOPRIM) 300 mg tablet     amLODIPine (NORVASC) 5 mg tablet     glipiZIDE SR (GLUCOTROL) 5 mg CR tablet     hydrochlorothiazide (HYDRODIURIL) 25 mg tablet     metoprolol (LOPRESSOR) 25 mg tablet     simvastatin (ZOCOR) 20 mg tablet     fexofenadine (ALLEGRA) 180 mg tablet Take  by mouth.  imipramine (TOFRANIL) 25 mg tablet Take 25 mg by mouth three (3) times daily.     sitaGLIPtin-metFORMIN (JANUMET) 50-1,000 mg per tablet Take 1 Tab by mouth two (2) times daily (with meals).  rivaroxaban (XARELTO) 20 mg tab tablet Take  by mouth daily.  Cholecalciferol, Vitamin D3, 1,000 unit cap Take  by mouth daily. No current facility-administered medications for this visit. History   Smoking Status    Former Smoker   Smokeless Tobacco    Former User    Quit date: 1968       Past Medical History:   Diagnosis Date    Anxiety     Arthritis     Diabetes ([address]     Falls     Fatigue     Hearing loss     History of knee replacement     Hypertension     Muscle weakness     Ringing in ears     Skipped beats     Stroke [facility])        Past Surgical History:   Procedure Laterality Date    COLONOSCOPY N/A 8/15/2017    COLONOSCOPY performed by Donna Paredes MD at [facility] HX KNEE REPLACEMENT  2016    R knee replacement    HX ORTHOPAEDIC      left knee replacement    HX PROSTATECTOMY         History reviewed. No pertinent family history. Social History     Social History    Marital status:      Spouse name: N/A    Number of children: N/A    Years of education: N/A     Social History Main Topics    Smoking status: Former Smoker    Smokeless tobacco: Former User     Quit date: 1968    Alcohol use 3.0 oz/week     5 Shots of liquor per week    Drug use: No    Sexual activity: Not Asked     Other Topics Concern    None     Social History Narrative       Review of Systems   Constitutional: Positive for malaise/fatigue. HENT: Negative for ear pain, hearing loss and tinnitus. Eyes: Negative for blurred vision, double vision and photophobia. Respiratory: Negative for shortness of breath and wheezing. Cardiovascular: Negative for chest pain and palpitations. Gastrointestinal: Negative for nausea and vomiting. Musculoskeletal: Negative for falls. Neurological: Positive for weakness. Negative for dizziness, tingling, seizures, loss of consciousness and headaches.    Psychiatric/Behavioral: Positive for depression and memory loss. Negative for hallucinations. The patient is nervous/anxious. Remainder of comprehensive review is negative. Physical Exam :    Visit Vitals    BP (!) 140/92    Ht 5' 11\" (1.803 m)    Wt 120.2 kg (265 lb)    BMI 36.96 kg/m2       General: Well defined, nourished, and groomed individual in no acute distress.    Neck: Supple, nontender, no bruits, no pain with resistance to active range of motion.    Heart: Regular rate and rhythm, no murmurs, rub, or gallop. Normal S1S2. Lungs: Clear to auscultation bilaterally with equal chest expansion, no cough, no wheeze  Musculoskeletal: Extremities revealed no edema and had full range of motion of joints.    Psych: Good mood and bright affect    NEUROLOGICAL EXAMINATION:    Mental Status: Alert and oriented to person, place, and time. mmse 30     Cranial Nerves:    II, III, IV, VI: Visual acuity grossly intact. Visual fields are normal.    Pupils are equal, round, and reactive to light and accommodation.    Extra-ocular movements are full and fluid. Fundoscopic exam was benign, no ptosis or nystagmus.    V-XII: Hearing is grossly intact. Facial features are symmetric, with normal sensation and strength. The palate rises symmetrically and the tongue protrudes midline. Sternocleidomastoids 5/5. Motor Examination: Normal tone, bulk, and strength, 5/5 muscle strength throughout. Coordination: Finger to nose was normal. No resting or intention tremor    Gait and Station: Steady while walking with rollator. Reflexes: DTRs 1+ throughout.           Results for orders placed or performed during the hospital encounter of 08/15/17   GLUCOSE, POC   Result Value Ref Range    Glucose (POC) 137 (H) 65 - 100 mg/dL    Performed by Galindo Campuzano This Encounter    CBC WITH AUTOMATED DIFF    METABOLIC PANEL, COMPREHENSIVE    THYROID PANEL W/TSH    VITAMIN B12 & FOLATE    VITAMIN D, 1, 25 DIHYDROXY    REFERRAL TO NEUROPSYCHOLOGY     Referral Priority:   Routine     Referral Type:   Consultation     Referral Reason:   Specialty Services Required     Referred to Provider:   Yasmine Miller, PsyD    risperiDONE (RISPERDAL) 0.5 mg tablet     Sig: Take 1 Tab by mouth nightly. Dispense:  90 Tab     Refill:  1    escitalopram oxalate (LEXAPRO) 20 mg tablet     Sig: Take 1.5 tablets by mouth daily     Dispense:  135 Tab     Refill:  1       1. Late onset Alzheimer's disease without behavioral disturbance    2. Gait disorder        Follow-up Disposition:  Return in about 2 months (around 12/17/2017). Memory is seeming to be stable we will retest with Dr. Justus Black as they are interested in seeing for progression. Also his mood has changed dramatically and they want to evalaute for other causes of his mood disturbance. We will increase Lexapro to 30 mg daily and see if this can help with energy level, bring back what he likes to do and help with his daytime sleepiness. Keep risperdal 0.5  Mg at night this is helping more sleeping through the night. He is to get some labs to evalaute further for causes of excessive daytime sleepiness to rule out other etiology. Continue with PT and even do exercises at home as this helps increase energy and balance. We will also keep on Namzaric . Stay  Busy mentally and physically.          This note will not be viewable in "[employer]hart

## 2017-10-17 NOTE — PROGRESS NOTES
PT DAILY TREATMENT NOTE - [facility] 2-15    Patient Name: Tamanna Gallego  Date:10/17/2017  : 1940  [x]  Patient  Verified  Payor: Magdaleno Franz / Plan: [place] MED[place] PART [place] & B / Product Type: Medicare /    In time: 10:00 AM  Out time: 10:50 AM  Total Treatment Time (min): 50  Total Timed Codes (min): 50  1:1 Treatment Time ( [address] only): 50  Visit #: 13    Treatment Area:  Other abnormalities of gait and mobility [R26.89]    SUBJECTIVE  Pain Level (0-10 scale): 310  Any medication changes, allergies to medications, adverse drug reactions, diagnosis change, or new procedure performed?: [x] No    [] Yes (see summary sheet for update)  Subjective functional status/changes:   [] No changes reported  Pt reports he did some of his balancing exercises while he has been away for 2 weeks    OBJECTIVE    40 min Therapeutic Exercise:  [x] See flow sheet :   Rationale: increase ROM and increase strength to improve the patients ability to sit, stand, transfer, ambulate, lift, carry, reach, complete ADLs    10 min Neuromuscular Re-education:  [x]  See flow sheet :   Rationale: improve coordination, improve balance and increase proprioception  to improve the patients ability to sit, stand, transfer, ambulate, lift, carry, reach, complete ADLs        With   [x] TE   [] TA   [x] neuro   [] other: Patient Education: [x] Review HEP    [] Progressed/Changed HEP based on:   [x] positioning   [x] body mechanics   [] transfers   [x] heat/ice application    [] other:      Other Objective/Functional Measures:   No LOB with today's balance exercises    Pain Level (0-10 scale) post treatment: 0    ASSESSMENT/Changes in Function:     Patient will continue to benefit from skilled PT services to modify and progress therapeutic interventions, address functional mobility deficits, address ROM deficits, address strength deficits, analyze and address soft tissue restrictions, analyze and cue movement patterns, analyze and modify body mechanics/ergonomics and assess and modify postural abnormalities to attain remaining goals. []  See Plan of Care  [x]  See progress note/recertification  []  See Discharge Summary         Progress towards goals / Updated goals:  Patient continues to require verbal cues to complete exercises with correct form and postural awareness. Patient was able to advance several exercises this visit and is progressing well towards goals.     PLAN  [x]  Upgrade activities as tolerated     [x]  Continue plan of care  [x]  Update interventions per flow sheet       []  Discharge due to:_  []  Other:_      Luz Marina Weems PT , DPT   10/17/2017  11:50 AM

## 2017-10-18 LAB
1,25(OH)2D3 SERPL-MCNC: 23.6 PG/ML (ref 19.9–79.3)
ALBUMIN SERPL-MCNC: 4.3 G/DL (ref 3.5–4.8)
ALBUMIN/GLOB SERPL: 1.6 {RATIO} (ref 1.2–2.2)
ALP SERPL-CCNC: 56 IU/L (ref 39–117)
ALT SERPL-CCNC: 17 IU/L (ref 0–44)
AST SERPL-CCNC: 19 IU/L (ref 0–40)
BASOPHILS # BLD AUTO: 0.1 X10E3/UL (ref 0–0.2)
BASOPHILS NFR BLD AUTO: 1 %
BILIRUB SERPL-MCNC: 0.4 MG/DL (ref 0–1.2)
BUN SERPL-MCNC: 34 MG/DL (ref 8–27)
BUN/CREAT SERPL: 24 (ref 10–24)
CALCIUM SERPL-MCNC: 9.9 MG/DL (ref 8.6–10.2)
CHLORIDE SERPL-SCNC: 98 MMOL/L (ref 96–106)
CO2 SERPL-SCNC: 26 MMOL/L (ref 18–29)
CREAT SERPL-MCNC: 1.42 MG/DL (ref 0.76–1.27)
EOSINOPHIL # BLD AUTO: 0.1 X10E3/UL (ref 0–0.4)
EOSINOPHIL NFR BLD AUTO: 1 %
ERYTHROCYTE [DISTWIDTH] IN BLOOD BY AUTOMATED COUNT: 14 % (ref 12.3–15.4)
FOLATE SERPL-MCNC: 11.8 NG/ML
FT4I SERPL CALC-MCNC: 1.4 (ref 1.2–4.9)
GLOBULIN SER CALC-MCNC: 2.7 G/DL (ref 1.5–4.5)
GLUCOSE SERPL-MCNC: 154 MG/DL (ref 65–99)
HCT VFR BLD AUTO: 42.5 % (ref 37.5–51)
HGB BLD-MCNC: 13.5 G/DL (ref 12.6–17.7)
IMM GRANULOCYTES # BLD: 0 X10E3/UL (ref 0–0.1)
IMM GRANULOCYTES NFR BLD: 0 %
LYMPHOCYTES # BLD AUTO: 2.3 X10E3/UL (ref 0.7–3.1)
LYMPHOCYTES NFR BLD AUTO: 21 %
MCH RBC QN AUTO: 30.4 PG (ref 26.6–33)
MCHC RBC AUTO-ENTMCNC: 31.8 G/DL (ref 31.5–35.7)
MCV RBC AUTO: 96 FL (ref 79–97)
MONOCYTES # BLD AUTO: 0.6 X10E3/UL (ref 0.1–0.9)
MONOCYTES NFR BLD AUTO: 6 %
NEUTROPHILS # BLD AUTO: 7.8 X10E3/UL (ref 1.4–7)
NEUTROPHILS NFR BLD AUTO: 71 %
PLATELET # BLD AUTO: 241 X10E3/UL (ref 150–379)
POTASSIUM SERPL-SCNC: 5.1 MMOL/L (ref 3.5–5.2)
PROT SERPL-MCNC: 7 G/DL (ref 6–8.5)
RBC # BLD AUTO: 4.44 X10E6/UL (ref 4.14–5.8)
SODIUM SERPL-SCNC: 143 MMOL/L (ref 134–144)
T3RU NFR SERPL: 27 % (ref 24–39)
T4 SERPL-MCNC: 5.3 UG/DL (ref 4.5–12)
TSH SERPL DL<=0.005 MIU/L-ACNC: 4.41 UIU/ML (ref 0.45–4.5)
VIT B12 SERPL-MCNC: 347 PG/ML (ref 211–946)
WBC # BLD AUTO: 10.9 X10E3/UL (ref 3.4–10.8)

## 2017-10-19 ENCOUNTER — HOSPITAL ENCOUNTER (OUTPATIENT)
Dept: PHYSICAL THERAPY | Age: 77
Discharge: HOME | End: 2017-10-19
Payer: MEDICARE

## 2017-10-19 ENCOUNTER — TELEPHONE (OUTPATIENT)
Dept: NEUROLOGY | Age: 77
End: 2017-10-19

## 2017-10-19 PROCEDURE — 97110 THERAPEUTIC EXERCISES: CPT | Performed by: PHYSICAL THERAPIST

## 2017-10-19 NOTE — TELEPHONE ENCOUNTER
Patient states that PCP has records that would be able to tell him if his kidney labs have been elevated before. Patient is not sure if they have. He requested that we send labs to primary care and he will follow up with PCP.

## 2017-10-19 NOTE — TELEPHONE ENCOUNTER
----- Message from Kiah Miranda NP sent at 10/19/2017  9:09 AM EDT -----  Can you let him know his WBC and neutrophils were high meaning when we thomas the labs he must have been getting over or just starting to get an infection. Also his kidney labs are elevated and we do not have any to compare this to. Has he been told this  Before? Can be caused by medications, dehydration, multiple things. May need to get him to PCP or nephrology if he has never been told this before thanks            ----- Message -----     From: Missael Dailey Lab Results In     Sent: 10/18/2017   4:39 PM       To:  Kiah Miranda NP

## 2017-10-19 NOTE — PROGRESS NOTES
PT DAILY TREATMENT NOTE - [facility] 2-15    Patient Name: Holly Post  Date:10/19/2017  : 1940  [x]  Patient  Verified  Payor: Brayden Ice / Plan: [place] MEDICARE PART A & B / Product Type: Medicare /    In time: 9:40 AM  Out time: 10:10 AM  Total Treatment Time (min): 30  Total Timed Codes (min): 30  1:1 Treatment Time ( [address] only): 30  Visit #: 14    Treatment Area:  Other abnormalities of gait and mobility [R26.89]    SUBJECTIVE  Pain Level (0-10 scale):310  Any medication changes, allergies to medications, adverse drug reactions, diagnosis change, or new procedure performed?: [x] No    [] Yes (see summary sheet for update)  Subjective functional status/changes:   [] No changes reported  Pt reports he is moving slow this morning    OBJECTIVE    10 min Therapeutic Exercise:  [x] See flow sheet :   Rationale: increase ROM and increase strength to improve the patients ability to sit, stand, transfer, ambulate, lift, carry, reach, complete ADLs    0 min Neuromuscular Re-education:  [x]  See flow sheet :   Rationale: improve coordination, improve balance and increase proprioception  to improve the patients ability to sit, stand, transfer, ambulate, lift, carry, reach, complete ADLs        With   [x] TE   [] TA   [x] neuro   [] other: Patient Education: [x] Review HEP    [] Progressed/Changed HEP based on:   [x] positioning   [x] body mechanics   [] transfers   [x] heat/ice application    [] other:      Other Objective/Functional Measures:   Pt unable to continue after recumbent bike due to sickness this visit    Pain Level (0-10 scale) post treatment: 0    ASSESSMENT/Changes in Function:     Patient will continue to benefit from skilled PT services to modify and progress therapeutic interventions, address functional mobility deficits, address ROM deficits, address strength deficits, analyze and address soft tissue restrictions, analyze and cue movement patterns, analyze and modify body mechanics/ergonomics and assess and modify postural abnormalities to attain remaining goals. []  See Plan of Care  []  See progress note/recertification  []  See Discharge Summary         Progress towards goals / Updated goals:  Exercises not advanced due to sickness.     PLAN  [x]  Upgrade activities as tolerated     [x]  Continue plan of care  [x]  Update interventions per flow sheet       []  Discharge due to:_  []  Other:_      Iliana Jenkins, PT , DPT   10/19/2017  10:00 AM

## 2017-10-23 ENCOUNTER — HOSPITAL ENCOUNTER (OUTPATIENT)
Dept: PHYSICAL THERAPY | Age: 77
Discharge: HOME | End: 2017-10-23
Payer: MEDICARE

## 2017-10-23 PROCEDURE — 97112 NEUROMUSCULAR REEDUCATION: CPT | Performed by: PHYSICAL THERAPIST

## 2017-10-23 PROCEDURE — 97110 THERAPEUTIC EXERCISES: CPT | Performed by: PHYSICAL THERAPIST

## 2017-10-23 NOTE — PROGRESS NOTES
PT DAILY TREATMENT NOTE - [facility] 2-15    Patient Name: Sylvain Clemens  Date:10/23/2017  : 1940  [x]  Patient  Verified  Payor: Caroline Breath / Plan: [place] MEDICARE PART A & B / Product Type: Medicare /    In time: 12:30 PM  Out time: 1:15 PM  Total Treatment Time (min): 45  Total Timed Codes (min): 45  1:1 Treatment Time ( [address] only): 40  Visit #: 15    Treatment Area:  Other abnormalities of gait and mobility [R26.89]    SUBJECTIVE  Pain Level (0-10 scale):3/10  Any medication changes, allergies to medications, adverse drug reactions, diagnosis change, or new procedure performed?: [x] No    [] Yes (see summary sheet for update)  Subjective functional status/changes:   [] No changes reported  Pt reports he is feeling better than last visit    OBJECTIVE    35 min Therapeutic Exercise:  [x] See flow sheet :   Rationale: increase ROM and increase strength to improve the patients ability to sit, stand, transfer, ambulate, lift, carry, reach, complete ADLs    10 min Neuromuscular Re-education:  [x]  See flow sheet :   Rationale: improve coordination, improve balance and increase proprioception  to improve the patients ability to sit, stand, transfer, ambulate, lift, carry, reach, complete ADLs        With   [x] TE   [] TA   [x] neuro   [] other: Patient Education: [x] Review HEP    [] Progressed/Changed HEP based on:   [x] positioning   [x] body mechanics   [] transfers   [x] heat/ice application    [] other:      Other Objective/Functional Measures:   Pt unable to perform EO heel to bunion stance this visit without LOB    Pain Level (0-10 scale) post treatment: 0    ASSESSMENT/Changes in Function:     Patient will continue to benefit from skilled PT services to modify and progress therapeutic interventions, address functional mobility deficits, address ROM deficits, address strength deficits, analyze and address soft tissue restrictions, analyze and cue movement patterns, analyze and modify body mechanics/ergonomics and assess and modify postural abnormalities to attain remaining goals. []  See Plan of Care  []  See progress note/recertification  []  See Discharge Summary         Progress towards goals / Updated goals:  Patient continues to require verbal cues to complete exercises with correct form and postural awareness. Patient was able to advance several exercises this visit and is progressing well towards goals.     PLAN  [x]  Upgrade activities as tolerated     [x]  Continue plan of care  [x]  Update interventions per flow sheet       []  Discharge due to:_  []  Other:_      Saroj Castro, PT , DPT   10/23/2017  10:00 AM

## 2017-10-25 ENCOUNTER — APPOINTMENT (OUTPATIENT)
Dept: PHYSICAL THERAPY | Age: 77
End: 2017-10-25
Payer: MEDICARE

## 2017-10-27 ENCOUNTER — TELEPHONE (OUTPATIENT)
Dept: NEUROLOGY | Age: 77
End: 2017-10-27

## 2017-10-30 ENCOUNTER — HOSPITAL ENCOUNTER (OUTPATIENT)
Dept: PHYSICAL THERAPY | Age: 77
Discharge: HOME | End: 2017-10-30
Payer: MEDICARE

## 2017-10-30 PROCEDURE — 97112 NEUROMUSCULAR REEDUCATION: CPT | Performed by: PHYSICAL THERAPIST

## 2017-10-30 PROCEDURE — G8980 MOBILITY D/C STATUS: HCPCS | Performed by: PHYSICAL THERAPIST

## 2017-10-30 PROCEDURE — G8979 MOBILITY GOAL STATUS: HCPCS | Performed by: PHYSICAL THERAPIST

## 2017-10-30 PROCEDURE — 97110 THERAPEUTIC EXERCISES: CPT | Performed by: PHYSICAL THERAPIST

## 2017-10-30 NOTE — PROGRESS NOTES
[facility]  [place]  [facility]  Phone: [phone]      Fax:  [phone]    Progress Note    Name: Esther Mejía   : 1940   MD: Gilda Marshall MD       Treatment Diagnosis: Other abnormalities of gait and mobility [R26.89]  Start of Care: 17    Visits from Start of Care: 16  Missed Visits: 0    Summary of Care: Therapeutic Exercise, Neuromuscular Re-education, Patient Education, Home Exercise Program     Assessment / Recommendations: The patient has completed 16 visits and has not made significant progress since last re-evaluation 17. The patient's progress has been slow due to decreased frequency of visits over the past month, secondary to travel and sickness. He also has poor compliance with independent HEP. The patient's FOTO outcomes score decreased from a 45% at initial evaluation to a 39% at today's visit. Will continue to progress as tolerated to improve patient's mobility in the home. Short Term Goals: To be accomplished in 4 weeks:  1) The patient will be independent with introductory HEP  Status at last note/certification: not met  Status at progress note: met  2) The patient will demonstrate negative Romberg test to indicate decreased risk of falls  Status at last note/certification: not met  Status at progress note: not met  3) The patient will demonstrate ability to transfer sit to stand without UE assist  Status at last note/certification: met  Status at progress note: not met  Long Term Goals:  To be accomplished in 12 weeks:  1) The patient will complete TUG test in 15 seconds or less to indicate improved functional mobility  Status at last note/certification: not met  Status at progress note: not met  2) The patient will negotiate 12 steps reciprocally with 1 railing, without LOB  Status at last note/certification: not met  Status at progress note: not met  3) The patient will demonstrate 4+/5 BLE strength to improve ease with household chores  Status at last note/certification: not met  Status at progress note: not met    G-Codes (GP)  Mobility  [id number] Current  CL= 60-79%   Goal  CK= 40-59%    The severity rating is based on the FOTO Score score. Darron Abhinav, PT 10/30/2017 11:36 AM    ________________________________________________________________________  NOTE TO PHYSICIAN:  Please complete the following and fax to: [facility]: Fax: [phone]. Ross Zuñiga Retain this original for your records. If you are unable to process this request in 24 hours, please contact our office.        ____ I have read the above report and request that my patient continue therapy with the following changes/special instructions:  ____ I have read the above report and request that my patient be discharged from therapy    Physician's Signature:_________________ Date:___________Time:__________

## 2017-11-03 ENCOUNTER — APPOINTMENT (OUTPATIENT)
Dept: PHYSICAL THERAPY | Age: 77
End: 2017-11-03
Payer: MEDICARE

## 2017-11-08 ENCOUNTER — HOSPITAL ENCOUNTER (OUTPATIENT)
Dept: PHYSICAL THERAPY | Age: 77
Discharge: HOME | End: 2017-11-08
Payer: MEDICARE

## 2017-11-08 PROCEDURE — 97110 THERAPEUTIC EXERCISES: CPT | Performed by: PHYSICAL THERAPIST

## 2017-11-08 PROCEDURE — 97112 NEUROMUSCULAR REEDUCATION: CPT | Performed by: PHYSICAL THERAPIST

## 2017-11-08 NOTE — PROGRESS NOTES
PT DAILY TREATMENT NOTE - [facility] 2-15    Patient Name: Pramod Mckeon  Date:2017  : 1940  [x]  Patient  Verified  Payor: Abigail Weir / Plan: [place] MEDICARE PART A & B / Product Type: Medicare /    In time: 1:05 PM  Out time: 2:00 PM  Total Treatment Time (min): 55  Total Timed Codes (min): 55  1:1 Treatment Time ( [address] only): 40  Visit #: 17    Treatment Area:  Other abnormalities of gait and mobility [R26.89]    SUBJECTIVE  Pain Level (0-10 scale): 3  Any medication changes, allergies to medications, adverse drug reactions, diagnosis change, or new procedure performed?: [x] No    [] Yes (see summary sheet for update)  Subjective functional status/changes:   [] No changes reported  The patient reports he is feeling pretty good today    OBJECTIVE    45 min Therapeutic Exercise:  [x] See flow sheet :   Rationale: increase ROM and increase strength to improve the patients ability to sit, stand, transfer, ambulate, lift, carry, reach, complete ADLs    10 min Neuromuscular Re-education:  [x]  See flow sheet :   Rationale: improve coordination, improve balance and increase proprioception  to improve the patients ability to sit, stand, transfer, ambulate, lift, carry, reach, complete ADLs        With   [x] TE   [] TA   [x] neuro   [] other: Patient Education: [x] Review HEP    [] Progressed/Changed HEP based on:   [x] positioning   [x] body mechanics   [] transfers   [x] heat/ice application    [] other:      Other Objective/Functional Measures:   Unable to advance balance exercise this visit    Pain Level (0-10 scale) post treatment: 3    ASSESSMENT/Changes in Function:     Patient will continue to benefit from skilled PT services to modify and progress therapeutic interventions, address functional mobility deficits, address ROM deficits, address strength deficits, analyze and address soft tissue restrictions, analyze and cue movement patterns, analyze and modify body mechanics/ergonomics and assess and modify postural abnormalities to attain remaining goals. []  See Plan of Care  []  See progress note/recertification  []  See Discharge Summary         Progress towards goals / Updated goals:  Patient continues to require verbal cues to complete exercises with correct form and postural awareness.      PLAN  [x]  Upgrade activities as tolerated     [x]  Continue plan of care  [x]  Update interventions per flow sheet       []  Discharge due to:_  []  Other:_      Rajani Hunt, PT , DPT   11/8/2017

## 2017-11-14 ENCOUNTER — HOSPITAL ENCOUNTER (OUTPATIENT)
Dept: PHYSICAL THERAPY | Age: 77
Discharge: HOME | End: 2017-11-14
Payer: MEDICARE

## 2017-11-14 PROCEDURE — 97112 NEUROMUSCULAR REEDUCATION: CPT | Performed by: PHYSICAL THERAPIST

## 2017-11-14 PROCEDURE — 97110 THERAPEUTIC EXERCISES: CPT | Performed by: PHYSICAL THERAPIST

## 2017-11-14 NOTE — PROGRESS NOTES
PT DAILY TREATMENT NOTE - [facility] 2-15    Patient Name: Rendall Curling  Date:2017  : 1940  [x]  Patient  Verified  Payor: Kriss Tobin / Plan: [place] PART [place] & B / Product Type: Medicare /    In time: 9:40 AM  Out time: 10:40 AM  Total Treatment Time (min): 60  Total Timed Codes (min): 45  1:1 Treatment Time ( [address] only): 40  Visit #: 18    Treatment Area:  Other abnormalities of gait and mobility [R26.89]    SUBJECTIVE  Pain Level (0-10 scale): 3  Any medication changes, allergies to medications, adverse drug reactions, diagnosis change, or new procedure performed?: [x] No    [] Yes (see summary sheet for update)  Subjective functional status/changes:   [] No changes reported  Pt reports he had a fall last week when he was getting out of bed \"My wife was at [employer] so I had to wait an hour for her to get me off the floor, she called 911\" Pt denies any injuries  Pt reports his wife bought a mattress topper which makes it harder to get in and out of the bed  OBJECTIVE    Modality rationale: decrease inflammation and decrease pain to improve the patients ability to sit, stand, transfer, ambulate, complete ADLs   Min Type Additional Details    [] Estim: []Att   []Unatt        []TENS instruct                  []IFC  []Premod   []NMES                     []Other:  []w/US   []w/ice   []w/heat  Position:  Location:    []  Traction: [] Cervical       []Lumbar                       [] Prone          []Supine                       []Intermittent   []Continuous Lbs:  [] before manual  [] after manual  []w/heat    []  Ultrasound: []Continuous   [] Pulsed at:                           []1MHz   []3MHz Location:  W/cm2:    [] Paraffin         Location:   []w/heat   15 [x]  Ice     []  Heat  []  Ice massage Position: supine  Location: L knee    []  Laser  []  Other: Position:  Location:      []  Vasopneumatic Device Pressure:       [] lo [] med [] hi   Temperature:      [x] Skin assessment post-treatment:  [x]intact []redness- no adverse reaction    []redness  adverse reaction:     40 min Therapeutic Exercise:  [x] See flow sheet :   Rationale: increase ROM and increase strength to improve the patients ability to sit, stand, transfer, ambulate, lift, carry, reach, complete ADLs    5 min Neuromuscular Re-education:  [x]  See flow sheet :   Rationale: improve coordination, improve balance and increase proprioception  to improve the patients ability to sit, stand, transfer, ambulate, lift, carry, reach, complete ADLs        With   [x] TE   [] TA   [x] neuro   [] other: Patient Education: [x] Review HEP    [] Progressed/Changed HEP based on:   [x] positioning   [x] body mechanics   [] transfers   [x] heat/ice application    [] other:      Other Objective/Functional Measures:   Pt limited by fatigue and knee pain  Pt had to be picked up by his wife in a W/c due to pain and fatigue  Pt given updated HEP   Pt unable to hold WBOS with EC  Pt required multiple seated rest breaks    Pain Level (0-10 scale) post treatment: 3    ASSESSMENT/Changes in Function:     Patient will continue to benefit from skilled PT services to modify and progress therapeutic interventions, address functional mobility deficits, address ROM deficits, address strength deficits, analyze and address soft tissue restrictions, analyze and cue movement patterns, analyze and modify body mechanics/ergonomics and assess and modify postural abnormalities to attain remaining goals. []  See Plan of Care  []  See progress note/recertification  []  See Discharge Summary         Progress towards goals / Updated goals:  Exercises not advanced.     PLAN  [x]  Upgrade activities as tolerated     [x]  Continue plan of care  [x]  Update interventions per flow sheet       []  Discharge due to:_  []  Other:_      Shell Green, PT , DPT   11/14/2017

## 2017-11-16 ENCOUNTER — APPOINTMENT (OUTPATIENT)
Dept: PHYSICAL THERAPY | Age: 77
End: 2017-11-16
Payer: MEDICARE

## 2017-11-17 RX ORDER — MEMANTINE HYDROCHLORIDE AND DONEPEZIL HYDROCHLORIDE 28; 10 MG/1; MG/1
CAPSULE ORAL
Qty: 90 CAP | Refills: 3 | Status: SHIPPED | OUTPATIENT
Start: 2017-11-17

## 2017-11-21 ENCOUNTER — TELEPHONE (OUTPATIENT)
Dept: NEUROLOGY | Age: 77
End: 2017-11-21

## 2017-11-21 NOTE — TELEPHONE ENCOUNTER
----- Message from Radha Restrepo sent at 11/21/2017 10:55 AM EST -----  Regarding: Dr. Honey Danielson, from [facility], inquired about the status of 3 long term care documents faxed on 11/07/17 and on today's date. Best contact [phone].

## 2017-11-27 ENCOUNTER — TELEPHONE (OUTPATIENT)
Dept: NEUROLOGY | Age: 77
End: 2017-11-27

## 2017-11-28 ENCOUNTER — APPOINTMENT (OUTPATIENT)
Dept: PHYSICAL THERAPY | Age: 77
End: 2017-11-28
Payer: MEDICARE

## 2017-12-01 ENCOUNTER — APPOINTMENT (OUTPATIENT)
Dept: PHYSICAL THERAPY | Age: 77
End: 2017-12-01

## 2017-12-01 ENCOUNTER — TELEPHONE (OUTPATIENT)
Dept: NEUROLOGY | Age: 77
End: 2017-12-01

## 2017-12-01 NOTE — TELEPHONE ENCOUNTER
----- Message from Carrie Daly sent at 12/1/2017  9:55 AM EST -----  Regarding: Dr. Lorena Callejas: [phone]  Pt's health insurance is requesting documents sent over on the 7th and 21st please get sent back. The contact number is [phone].

## 2017-12-07 ENCOUNTER — TELEPHONE (OUTPATIENT)
Dept: NEUROLOGY | Age: 77
End: 2017-12-07

## 2017-12-07 NOTE — TELEPHONE ENCOUNTER
----- Message from Marquita Romero sent at 12/7/2017 10:21 AM EST -----  Regarding: Dr. Oracio Valdez([facility]) called regarding status of 3 forms for cognitive eval, chronic illness , and tax qualified addendum faxed on 11/07, 11/21/  And 12/01/17. Best contact number [phone].

## 2017-12-07 NOTE — TELEPHONE ENCOUNTER
Called and spoke to linda from [facility]. She said they faxed over 3 forms but we have not received them. She will refax forms to [facility] office.

## 2017-12-13 NOTE — ANCILLARY DISCHARGE INSTRUCTIONS
[address] [facility], [address] [place] [address] Drive  Phone: [phone]  Fax: [phone]    Discharge Summary  2-15    Patient name: Chetan Devine  : 1940  Provider#: [id number]  Referral source: Veto Balderrama MD      Medical/Treatment Diagnosis: Other abnormalities of gait and mobility [R26.89]     Prior Hospitalization: see medical history     Comorbidities: See Plan of Care  Prior Level of Function:See Plan of Care  Medications: Verified on Patient Summary List    Start of Care: 17      Onset Date:1 year prior to start of care   Visits from Start of Care: 18     Missed Visits: 4  Reporting Period : 17 to 17      ASSESSMENT/SUMMARY OF CARE: The patient has not been seen since 17. Below is the patient's status at last re-evaluation 10/30/17: The patient has completed 16 visits and has not made significant progress since last re-evaluation 17. The patient's progress has been slow due to decreased frequency of visits over the past month, secondary to travel and sickness. He also has poor compliance with independent HEP. The patient's FOTO outcomes score decreased from a 45% at initial evaluation to a 39% at today's visit.   Will continue to progress as tolerated to improve patient's mobility in the home.     Short Term Goals: To be accomplished in 4 weeks:  1) The patient will be independent with introductory HEP  Status at last note/certification: not met  Status at progress note: met  2) The patient will demonstrate negative Romberg test to indicate decreased risk of falls  Status at last note/certification: not met  Status at progress note: not met  3) The patient will demonstrate ability to transfer sit to stand without UE assist  Status at last note/certification: met  Status at progress note: not met  Long Term Goals: To be accomplished in 12 weeks:  1) The patient will complete TUG test in 15 seconds or less to indicate improved functional mobility  Status at last note/certification: not met  Status at progress note: not met  2) The patient will negotiate 12 steps reciprocally with 1 railing, without LOB  Status at last note/certification: not met  Status at progress note: not met  3) The patient will demonstrate 4+/5 BLE strength to improve ease with household chores  Status at last note/certification: not met  Status at progress note: not met        RECOMMENDATIONS:  [x]Discontinue therapy: []Patient has reached or is progressing toward set goals      []Patient is non-compliant or has abdicated      [x]Due to lack of appreciable progress towards set goals      []Other    Luz Marina Weems, PT 12/13/2017 4:38 PM

## 2017-12-14 ENCOUNTER — TELEPHONE (OUTPATIENT)
Dept: NEUROLOGY | Age: 77
End: 2017-12-14

## 2017-12-14 NOTE — TELEPHONE ENCOUNTER
----- Message from Abmrose Clark sent at 12/14/2017  2:01 PM EST -----  Regarding: /Telephone  Mary Lou Burks, from [facility] was calling to check the status of the paperwork for home health. Best contact number is [phone].

## 2018-01-22 ENCOUNTER — OFFICE VISIT (OUTPATIENT)
Dept: NEUROLOGY | Age: 78
End: 2018-01-22

## 2018-01-22 VITALS
HEIGHT: 71 IN | WEIGHT: 250.5 LBS | HEART RATE: 57 BPM | SYSTOLIC BLOOD PRESSURE: 128 MMHG | OXYGEN SATURATION: 95 % | BODY MASS INDEX: 35.07 KG/M2 | DIASTOLIC BLOOD PRESSURE: 72 MMHG | RESPIRATION RATE: 17 BRPM

## 2018-01-22 DIAGNOSIS — G30.1 LATE ONSET ALZHEIMER'S DISEASE WITHOUT BEHAVIORAL DISTURBANCE (HCC): Primary | ICD-10-CM

## 2018-01-22 DIAGNOSIS — F02.80 LATE ONSET ALZHEIMER'S DISEASE WITHOUT BEHAVIORAL DISTURBANCE (HCC): Primary | ICD-10-CM

## 2018-01-22 RX ORDER — DICYCLOMINE HYDROCHLORIDE 10 MG/1
10 CAPSULE ORAL DAILY
COMMUNITY

## 2018-01-22 NOTE — PROGRESS NOTES
Date:  18     Name:  Glory Diop  :  1940  MRN:  [id number]     PCP:  Tono Milligan MD    Chief Complaint   Patient presents with    Memory Loss     follow up       HISTORY OF PRESENT ILLNESS: Follow up for  Dementia. He is very unmotivated during the day and has no energy . He will sleep a lot and take naps during the day. Does not want to get out on a walk, or exercise, or do anything other then sleep or watch tv. He states he will read sometimes but unable to retain the informations,so stops. He is sleeping better with risperdal 0.5mg nightly. He is taking his risperdal at 10pm-11pm. Waking in at 10 am most mornings. He is on namzaric without issue. He stopped PT  because it was to much. He became very agitated     He is also on Lexpro 30mg He notes that his mood is more calm.           Allergies         Except as noted above, denies  fever, chills, cough. No CP or SOB. No dysuria, loss of bowel or bladder control. No Weight loss. Appetite good. Sleeping well. No sweats. No edema. No bruising or bleeding. No nausea or vomit. No diarrhea. No frequency, urgency, No depressive sxs. No anxiety. Denies sore throat, nasal congestion, nasal discharge, epistaxis, tinnitus, hearing loss, back pain, muscle pain, or joint pain. Current Outpatient Prescriptions   Medication Sig    dicyclomine (BENTYL) 10 mg capsule Take 10 mg by mouth daily.  NAMZARIC 28-10 mg CSpX TAKE 1 CAPSULE DAILY    risperiDONE (RISPERDAL) 0.5 mg tablet Take 1 Tab by mouth nightly.  escitalopram oxalate (LEXAPRO) 20 mg tablet Take 1.5 tablets by mouth daily    allopurinol (ZYLOPRIM) 300 mg tablet     amLODIPine (NORVASC) 5 mg tablet     glipiZIDE SR (GLUCOTROL) 5 mg CR tablet     hydrochlorothiazide (HYDRODIURIL) 25 mg tablet     metoprolol (LOPRESSOR) 25 mg tablet     simvastatin (ZOCOR) 20 mg tablet     fexofenadine (ALLEGRA) 180 mg tablet Take  by mouth.     imipramine (TOFRANIL) 25 mg tablet Take 25 mg by mouth three (3) times daily.  sitaGLIPtin-metFORMIN (JANUMET) 50-1,000 mg per tablet Take 1 Tab by mouth two (2) times daily (with meals).  rivaroxaban (XARELTO) 20 mg tab tablet Take  by mouth daily.  Cholecalciferol, Vitamin D3, 1,000 unit cap Take  by mouth daily. No current facility-administered medications for this visit. Allergies   Allergen Reactions    Atenolol Unknown (comments)    Dilaudid [Hydromorphone] Unknown (comments)    Lisinopril Unknown (comments)    Oxycodone Unknown (comments)     Past Medical History:   Diagnosis Date    Anxiety     Arthritis     Diabetes ([address]     Falls     Fatigue     Hearing loss     History of knee replacement     Hypertension     Muscle weakness     Ringing in ears     Skipped beats     Stroke [facility])      Past Surgical History:   Procedure Laterality Date    COLONOSCOPY N/A 8/15/2017    COLONOSCOPY performed by Michelle Guerrero MD at [facility] HX KNEE REPLACEMENT  2016    R knee replacement    HX ORTHOPAEDIC      left knee replacement    HX PROSTATECTOMY       Social History     Social History    Marital status:      Spouse name: N/A    Number of children: N/A    Years of education: N/A     Occupational History    Not on file. Social History Main Topics    Smoking status: Former Smoker    Smokeless tobacco: Former User     Quit date: 1968    Alcohol use 3.0 oz/week     5 Shots of liquor per week    Drug use: No    Sexual activity: Not on file     Other Topics Concern    Not on file     Social History Narrative     History reviewed. No pertinent family history.     PHYSICAL EXAMINATION:    Visit Vitals    /72    Pulse (!) 57    Resp 17    Ht 5' 11\" (1.803 m)    Wt 250 lb 8 oz (113.6 kg)    SpO2 95%    BMI 34.94 kg/m2   General: Well defined, nourished, and groomed individual in no acute distress.    Neck: Supple, nontender, no bruits, no pain with resistance to active range of motion.    Heart: Regular rate and rhythm, no murmurs, rub, or gallop. Normal S1S2. Lungs: Clear to auscultation bilaterally with equal chest expansion, no cough, no wheeze  Musculoskeletal: Extremities revealed no edema and had full range of motion of joints.    Psych: Good mood and bright affect     NEUROLOGICAL EXAMINATION:    Mental Status: Alert and oriented to person, place, and time.      Cranial Nerves:    II, III, IV, VI: Visual acuity grossly intact. Visual fields are normal.    Pupils are equal, round, and reactive to light and accommodation.    Extra-ocular movements are full and fluid. Fundoscopic exam was benign, no ptosis or nystagmus.    V-XII: Hearing is grossly intact. Facial features are symmetric, with normal sensation and strength. The palate rises symmetrically and the tongue protrudes midline. Sternocleidomastoids 5/5.      Motor Examination: Normal tone, bulk, and strength, 5/5 muscle strength throughout.      Coordination: Finger to nose was normal. Present resting  And  intention tremor     Gait and Station: unsteady while walking. In a wheelchair     Reflexes: DTRs 1+ throughout. ASSESSMENT AND PLAN    ICD-10-CM ICD-9-CM    1. Late onset Alzheimer's disease without behavioral disturbance G30.1 331.0     F02.80 294.10       Dementia stable. Continue Namzaric as prescribed. ContinueLexapro to 30 mg daily. Keep Risperdal 0.5  Mg at night this is helping more sleeping through the night, but take it around 8-9pm to decrease daytime sleep. We discussed the importance of stay active mentally and Physical. Educated wife and patient that as the disease progression symptom may get worse. Follow up in 6 monthss   Alana Mora NP'    I have reviewed the documentation provided by the nurse practitioner, Ms. Mcneal Olgakevin, and we have discussed her findings and the clinical impression. I have formulated with her the proposed management plans for this patient.   Additionally,  I have personally evaluated the patient to verify the history and to confirm physical findings. Below are my additional comments:  Patient returns today for follow-up dementia the also mistyped. He has been maintained on Namzaric. Also using some Lexapro for depression and Risperdal to help with some agitation. Wife reports that he continues to be unmotivated. She is assisting him with his ADLs such as bathing and dressing. He is in a wheelchair today for long distances secondary to the fact that he is unsteady. Today regarding progression of disease medications etc.  He looks well and appears to be well cared for. He is awake alert oriented to time and place. We will continue her current regimen. Follow-up in 6 months. Ina Tavarez MD  This note will not be viewable in [address]

## 2018-01-22 NOTE — MR AVS SNAPSHOT
[facility] 
 
 
 [place] [address] [address] [phone] Patient: Cydney Bowers MRN: [id number] :1940 Visit Information Date & Time Provider Department Dept. Phone Encounter #  
 2018 10:40 AM Man Salvador MD [facility] Neurology [facility] [phone] [id number] Follow-up Instructions Return in about 6 months (around 2018). Your Appointments 3/15/2018 10:00 AM  
New Patient with Sg Dodd PsyD  [facility] [address] ([address]) Appt Note: re evaluation for testing/ Rafael Chucho [place] [address] [address] [phone]  
  
   
 [facility] [address] [id number] [address] Upcoming Health Maintenance Date Due DTaP/Tdap/Td series (1 - Tdap) 10/9/1960 ZOSTER VACCINE AGE 60> 1999 GLAUCOMA SCREENING Q2Y 10/9/2004 Pneumococcal 65+ Low/Medium Risk (1 of 2 - PCV13) 10/9/2004 MEDICARE YEARLY EXAM 10/9/2004 Influenza Age 5 to Adult 2017 Allergies as of 2018  Review Complete On: 2018 By: Man Salvador MD  
  
 Severity Noted Reaction Type Reactions Atenolol  2016    Unknown (comments) Dilaudid [Hydromorphone]  2016    Unknown (comments) Lisinopril  2016    Unknown (comments) Oxycodone  2016    Unknown (comments) Current Immunizations  Never Reviewed No immunizations on file. Not reviewed this visit Vitals BP Pulse Resp Height(growth percentile) Weight(growth percentile) SpO2  
 128/72 (!) 57 17 5' 11\" (1.803 m) 250 lb 8 oz (113.6 kg) 95% BMI Smoking Status 34.94 kg/m2 Former Smoker BMI and BSA Data Body Mass Index Body Surface Area 34.94 kg/m 2 2.39 m 2 [phone] Your Updated Medication List  
  
   
This list is accurate as of: 1/22/18 11:40 AM.  Always use your most recent med list.  
  
  
  
  
 allopurinol 300 mg tablet Commonly known as:  ZYLOPRIM  
  
 amLODIPine 5 mg tablet Commonly known as:  NORVASC  
  
 cholecalciferol 1,000 unit Cap Commonly known as:  VITAMIN D3 Take  by mouth daily. dicyclomine 10 mg capsule Commonly known as:  BENTYL Take 10 mg by mouth daily. escitalopram oxalate 20 mg tablet Commonly known as:  Le Friendly Take 1.5 tablets by mouth daily  
  
 fexofenadine 180 mg tablet Commonly known as:  Jolyne Gall Take  by mouth. glipiZIDE SR 5 mg CR tablet Commonly known as:  GLUCOTROL XL  
  
 hydroCHLOROthiazide 25 mg tablet Commonly known as:  HYDRODIURIL  
  
 imipramine 25 mg tablet Commonly known as:  TOFRANIL Take 25 mg by mouth three (3) times daily. metoprolol tartrate 25 mg tablet Commonly known as:  LOPRESSOR  
  
 NAMZARIC 28-10 mg Cspx Generic drug:  memantine-donepezil TAKE 1 CAPSULE DAILY  
  
 risperiDONE 0.5 mg tablet Commonly known as:  RisperDAL Take 1 Tab by mouth nightly. rivaroxaban 20 mg Tab tablet Commonly known as:  Marli Beat Take  by mouth daily. simvastatin 20 mg tablet Commonly known as:  ZOCOR SITagliptin-metFORMIN 50-1,000 mg per tablet Commonly known as:  Keyla Hawk Take 1 Tab by mouth two (2) times daily (with meals). Follow-up Instructions Return in about 6 months (around 7/22/2018). Introducing hospitals & HEALTH SERVICES! Laureen Andersen introduces [employer] patient portal. Now you can access parts of your medical record, email your doctor's office, and request medication refills online. 1. In your internet browser, go to [email or web address] 2. Click on the First Time User? Click Here link in the Sign In box.  You will see the New Member Sign Up page. 3. Enter your [employer] Access Code exactly as it appears below. You will not need to use this code after youve completed the sign-up process. If you do not sign up before the expiration date, you must request a new code. · [employer] Access Code: [email or web address] Expires: 2/13/2018  2:04 PM 
 
4. Enter the last four digits of your Social Security Number (xxxx) and Date of Birth (mm/dd/yyyy) as indicated and click Submit. You will be taken to the next sign-up page. 5. Create a [employer] ID. This will be your [employer] login ID and cannot be changed, so think of one that is secure and easy to remember. 6. Create a [employer] password. You can change your password at any time. 7. Enter your Password Reset Question and Answer. This can be used at a later time if you forget your password. 8. Enter your e-mail address. You will receive e-mail notification when new information is available in [address] 9. Click Sign Up. You can now view and download portions of your medical record. 10. Click the Download Summary menu link to download a portable copy of your medical information. If you have questions, please visit the Frequently Asked Questions section of the [employer] website. Remember, [employer] is NOT to be used for urgent needs. For medical emergencies, dial 911. Now available from your iPhone and Android! Please provide this summary of care documentation to your next provider. Your primary care clinician is listed as [facility]. If you have any questions after today's visit, please call [phone].

## 2018-01-22 NOTE — PROGRESS NOTES
Follow up for memory loss. No significant changes in memory since last visit. Patient very unsteady and uses wheelchair. Spouse reports patient is totally dependent for ADL's. Spouse requesting long term care paperwork be filled out. Advised spouse per provider as with original paperwork, we would only send office notes. Practice administrator in to speak to patient and spouse re paperwork completion.

## 2018-02-05 ENCOUNTER — TELEPHONE (OUTPATIENT)
Dept: NEUROLOGY | Age: 78
End: 2018-02-05

## 2018-02-05 NOTE — TELEPHONE ENCOUNTER
----- Message from Fabrizio Parker sent at 2/5/2018 10:44 AM EST -----  Regarding: Dr.  Rawland Shaper is calling on behalf of pt requesting a call back from the nurse today regarding getting the medical records to the insurance company. Mrs Lakisha Cui can be reached at [phone].

## 2018-02-05 NOTE — TELEPHONE ENCOUNTER
Called and spoke to wife she states the form that was sent over and Dr Adriana Tyson states to just send medical records. The insurance co only needs dr bennett to sign the form and explain that he is sending medical records they can not go forward without his signature. Medical records has sent the records to insurance co .  She states the form is to be faxed over to office for his signature. Would like a call back if this is going to be done or not either way. Let her know dr Alise Edouard nurse will call either today or tomorrow with this.

## 2018-02-05 NOTE — TELEPHONE ENCOUNTER
----- Message from Neto Amato sent at 2/5/2018 11:20 AM EST -----  Regarding: [facility] for the pt's forms to be sent to her. She advised that she has sent in the documents on several occasions and has not gotten them back as of yet. Best contact number is [phone].

## 2018-02-05 NOTE — TELEPHONE ENCOUNTER
----- Message from Alex Drummond sent at 2/5/2018 11:51 AM EST -----  Regarding: Dr. Erwin West, wife is calling to notify the nurse that the insurance company has said that a person with a state license can do  The paperwork, it does not have to be . [facility] can be reached at [phone].

## 2018-02-08 ENCOUNTER — APPOINTMENT (OUTPATIENT)
Dept: CT IMAGING | Age: 78
End: 2018-02-08
Attending: NURSE PRACTITIONER
Payer: MEDICARE

## 2018-02-08 ENCOUNTER — APPOINTMENT (OUTPATIENT)
Dept: GENERAL RADIOLOGY | Age: 78
End: 2018-02-08
Attending: NURSE PRACTITIONER
Payer: MEDICARE

## 2018-02-08 ENCOUNTER — HOSPITAL ENCOUNTER (EMERGENCY)
Age: 78
Discharge: HOME | End: 2018-02-08
Attending: EMERGENCY MEDICINE
Payer: MEDICARE

## 2018-02-08 VITALS
WEIGHT: 250 LBS | TEMPERATURE: 97.8 F | DIASTOLIC BLOOD PRESSURE: 63 MMHG | OXYGEN SATURATION: 94 % | HEART RATE: 62 BPM | SYSTOLIC BLOOD PRESSURE: 100 MMHG | RESPIRATION RATE: 20 BRPM | BODY MASS INDEX: 34.87 KG/M2

## 2018-02-08 DIAGNOSIS — E86.0 DEHYDRATION: Primary | ICD-10-CM

## 2018-02-08 LAB
ALBUMIN SERPL-MCNC: 3.4 G/DL (ref 3.5–5)
ALBUMIN/GLOB SERPL: 0.8 {RATIO} (ref 1.1–2.2)
ALP SERPL-CCNC: 62 U/L (ref 45–117)
ALT SERPL-CCNC: 18 U/L (ref 12–78)
ANION GAP SERPL CALC-SCNC: 9 MMOL/L (ref 5–15)
APPEARANCE UR: CLEAR
AST SERPL-CCNC: 25 U/L (ref 15–37)
ATRIAL RATE: 288 BPM
BACTERIA URNS QL MICRO: NEGATIVE /HPF
BASOPHILS # BLD: 0.1 K/UL (ref 0–0.1)
BASOPHILS NFR BLD: 1 % (ref 0–1)
BILIRUB SERPL-MCNC: 0.5 MG/DL (ref 0.2–1)
BILIRUB UR QL: NEGATIVE
BUN SERPL-MCNC: 32 MG/DL (ref 6–20)
BUN/CREAT SERPL: 24 (ref 12–20)
CALCIUM SERPL-MCNC: 9.1 MG/DL (ref 8.5–10.1)
CALCULATED R AXIS, ECG10: -36 DEGREES
CALCULATED T AXIS, ECG11: -42 DEGREES
CHLORIDE SERPL-SCNC: 102 MMOL/L (ref 97–108)
CO2 SERPL-SCNC: 28 MMOL/L (ref 21–32)
COLOR UR: NORMAL
CREAT SERPL-MCNC: 1.32 MG/DL (ref 0.7–1.3)
DIAGNOSIS, 93000: NORMAL
DIFFERENTIAL METHOD BLD: ABNORMAL
EOSINOPHIL # BLD: 0.2 K/UL (ref 0–0.4)
EOSINOPHIL NFR BLD: 2 % (ref 0–7)
EPITH CASTS URNS QL MICRO: NORMAL /LPF
ERYTHROCYTE [DISTWIDTH] IN BLOOD BY AUTOMATED COUNT: 14.1 % (ref 11.5–14.5)
GLOBULIN SER CALC-MCNC: 4.1 G/DL (ref 2–4)
GLUCOSE SERPL-MCNC: 150 MG/DL (ref 65–100)
GLUCOSE UR STRIP.AUTO-MCNC: NEGATIVE MG/DL
HCT VFR BLD AUTO: 40.1 % (ref 36.6–50.3)
HGB BLD-MCNC: 12.7 G/DL (ref 12.1–17)
HGB UR QL STRIP: NEGATIVE
IMM GRANULOCYTES # BLD: 0 K/UL (ref 0–0.04)
IMM GRANULOCYTES NFR BLD AUTO: 0 % (ref 0–0.5)
KETONES UR QL STRIP.AUTO: NEGATIVE MG/DL
LACTATE SERPL-SCNC: 2.4 MMOL/L (ref 0.4–2)
LEUKOCYTE ESTERASE UR QL STRIP.AUTO: NEGATIVE
LYMPHOCYTES # BLD: 1.3 K/UL (ref 0.8–3.5)
LYMPHOCYTES NFR BLD: 13 % (ref 12–49)
MCH RBC QN AUTO: 30.5 PG (ref 26–34)
MCHC RBC AUTO-ENTMCNC: 31.7 G/DL (ref 30–36.5)
MCV RBC AUTO: 96.4 FL (ref 80–99)
MONOCYTES # BLD: 0.6 K/UL (ref 0–1)
MONOCYTES NFR BLD: 7 % (ref 5–13)
NEUTS SEG # BLD: 7.6 K/UL (ref 1.8–8)
NEUTS SEG NFR BLD: 78 % (ref 32–75)
NITRITE UR QL STRIP.AUTO: NEGATIVE
NRBC # BLD: 0 K/UL (ref 0–0.01)
NRBC BLD-RTO: 0 PER 100 WBC
PH UR STRIP: 5 [PH] (ref 5–8)
PLATELET # BLD AUTO: 292 K/UL (ref 150–400)
PMV BLD AUTO: 12.7 FL (ref 8.9–12.9)
POTASSIUM SERPL-SCNC: 5 MMOL/L (ref 3.5–5.1)
PROT SERPL-MCNC: 7.5 G/DL (ref 6.4–8.2)
PROT UR STRIP-MCNC: NEGATIVE MG/DL
Q-T INTERVAL, ECG07: 554 MS
QRS DURATION, ECG06: 92 MS
QTC CALCULATION (BEZET), ECG08: 543 MS
RBC # BLD AUTO: 4.16 M/UL (ref 4.1–5.7)
RBC #/AREA URNS HPF: NORMAL /HPF (ref 0–5)
SODIUM SERPL-SCNC: 139 MMOL/L (ref 136–145)
SP GR UR REFRACTOMETRY: 1.02 (ref 1–1.03)
TROPONIN I SERPL-MCNC: <0.04 NG/ML
UR CULT HOLD, URHOLD: NORMAL
UROBILINOGEN UR QL STRIP.AUTO: 0.2 EU/DL (ref 0.2–1)
VENTRICULAR RATE, ECG03: 58 BPM
WBC # BLD AUTO: 9.8 K/UL (ref 4.1–11.1)
WBC URNS QL MICRO: NORMAL /HPF (ref 0–4)

## 2018-02-08 PROCEDURE — 36415 COLL VENOUS BLD VENIPUNCTURE: CPT | Performed by: NURSE PRACTITIONER

## 2018-02-08 PROCEDURE — 96360 HYDRATION IV INFUSION INIT: CPT

## 2018-02-08 PROCEDURE — 99285 EMERGENCY DEPT VISIT HI MDM: CPT

## 2018-02-08 PROCEDURE — 93005 ELECTROCARDIOGRAM TRACING: CPT

## 2018-02-08 PROCEDURE — 71045 X-RAY EXAM CHEST 1 VIEW: CPT

## 2018-02-08 PROCEDURE — 74011250636 HC RX REV CODE- 250/636: Performed by: NURSE PRACTITIONER

## 2018-02-08 PROCEDURE — 83605 ASSAY OF LACTIC ACID: CPT | Performed by: NURSE PRACTITIONER

## 2018-02-08 PROCEDURE — 84484 ASSAY OF TROPONIN QUANT: CPT | Performed by: NURSE PRACTITIONER

## 2018-02-08 PROCEDURE — 85025 COMPLETE CBC W/AUTO DIFF WBC: CPT | Performed by: NURSE PRACTITIONER

## 2018-02-08 PROCEDURE — 81001 URINALYSIS AUTO W/SCOPE: CPT | Performed by: NURSE PRACTITIONER

## 2018-02-08 PROCEDURE — 87040 BLOOD CULTURE FOR BACTERIA: CPT | Performed by: NURSE PRACTITIONER

## 2018-02-08 PROCEDURE — 80053 COMPREHEN METABOLIC PANEL: CPT | Performed by: NURSE PRACTITIONER

## 2018-02-08 PROCEDURE — 70450 CT HEAD/BRAIN W/O DYE: CPT

## 2018-02-08 RX ADMIN — SODIUM CHLORIDE 1000 ML: 900 INJECTION, SOLUTION INTRAVENOUS at 14:19

## 2018-02-08 NOTE — TELEPHONE ENCOUNTER
Form was not completed but sent to [facility] to send office notes. Practice administrator to contact Garrison Harrison.

## 2018-02-08 NOTE — ED NOTES
Patient with altered mental status and slurred speech, per wife possibly started last night but worsened this morning. Patient is confused in triage. Stephanie Rides NP in triage evaluating patient. Tremors noted in triage, normal for patient per wife. Also with hx of dementia.

## 2018-02-08 NOTE — DISCHARGE INSTRUCTIONS
Dehydration: Care Instructions  Your Care Instructions  Dehydration happens when your body loses too much fluid. This might happen when you do not drink enough water or you lose large amounts of fluids from your body because of diarrhea, vomiting, or sweating. Severe dehydration can be life-threatening. Water and minerals called electrolytes help put your body fluids back in balance. Learn the early signs of fluid loss, and drink more fluids to prevent dehydration. Follow-up care is a key part of your treatment and safety. Be sure to make and go to all appointments, and call your doctor if you are having problems. It's also a good idea to know your test results and keep a list of the medicines you take. How can you care for yourself at home? · To prevent dehydration, drink plenty of fluids, enough so that your urine is light yellow or clear like water. Choose water and other caffeine-free clear liquids until you feel better. If you have kidney, heart, or liver disease and have to limit fluids, talk with your doctor before you increase the amount of fluids you drink. · If you do not feel like eating or drinking, try taking small sips of water, sports drinks, or other rehydration drinks. · Get plenty of rest.  To prevent dehydration  · Add more fluids to your diet and daily routine, unless your doctor has told you not to. · During hot weather, drink more fluids. Drink even more fluids if you exercise a lot. Stay away from drinks with alcohol or caffeine. · Watch for the symptoms of dehydration. These include:  ¨ A dry, sticky mouth. ¨ Dark yellow urine, and not much of it. ¨ Dry and sunken eyes. ¨ Feeling very tired. · Learn what problems can lead to dehydration. These include:  ¨ Diarrhea, fever, and vomiting. ¨ Any illness with a fever, such as pneumonia or the flu. ¨ Activities that cause heavy sweating, such as endurance races and heavy outdoor work in hot or humid weather.   ¨ Alcohol or drug abuse or withdrawal.  ¨ Certain medicines, such as cold and allergy pills (antihistamines), diet pills (diuretics), and laxatives. ¨ Certain diseases, such as diabetes, cancer, and heart or kidney disease. When should you call for help? Call 911 anytime you think you may need emergency care. For example, call if:  ? · You passed out (lost consciousness). ?Call your doctor now or seek immediate medical care if:  ? · You are confused and cannot think clearly. ? · You are dizzy or lightheaded, or you feel like you may faint. ? · You have signs of needing more fluids. You have sunken eyes and a dry mouth, and you pass only a little dark urine. ? · You cannot keep fluids down. ? Watch closely for changes in your health, and be sure to contact your doctor if:  ? · You are not making tears. ? · Your skin is very dry and sags slowly back into place after you pinch it. ? · Your mouth and eyes are very dry. Where can you learn more? Go to http://saira-gogo.info/. Enter Q473 in the search box to learn more about \"Dehydration: Care Instructions. \"  Current as of: March 20, 2017  Content Version: 11.4  © 1688-7075 [employer]. Care instructions adapted under license by [employer] (which disclaims liability or warranty for this information). If you have questions about a medical condition or this instruction, always ask your healthcare professional. [place] [address] any warranty or liability for your use of this information. We hope that we have addressed all of your medical concerns. The examination and treatment you received in the Emergency Department were for an emergent problem and were not intended as complete care. It is important that you follow up with your healthcare provider(s) for ongoing care.  If your symptoms worsen or do not improve as expected, and you are unable to reach your usual health care provider(s), you should return to the Emergency Department. Today's healthcare is undergoing tremendous change, and patient satisfaction surveys are one of the many tools to assess the quality of medical care. You may receive a survey from the [employer] regarding your experience in the Emergency Department. I hope that your experience has been completely positive, particularly the medical care that I provided. As such, please participate in the survey; anything less than excellent does not meet my expectations or intentions. [facility] and [facility] participate in nationally recognized quality of care measures. If your blood pressure is greater than 120/80, as reported below, we urge that you seek medical care to address the potential of high blood pressure, commonly known as hypertension. Hypertension can be hereditary or can be caused by certain medical conditions, pain, stress, or \"white coat syndrome. \"       Please make an appointment with your health care provider(s) for follow up of your Emergency Department visit. VITALS:   Patient Vitals for the past 8 hrs:   Temp Pulse Resp BP SpO2   02/08/18 1300 - 64 23 131/71 94 %   02/08/18 1218 97.8 °F (36.6 °C) 69 18 112/79 98 %          Thank you for allowing us to provide you with medical care today. We realize that you have many choices for your emergency care needs. Please choose us in the future for any continued health care needs. JOELLE Zimmer [facility]: [phone]            Recent Results (from the past 24 hour(s))   EKG, 12 LEAD, INITIAL    Collection Time: 02/08/18 12:40 PM   Result Value Ref Range    Ventricular Rate 58 BPM    Atrial Rate 288 BPM    QRS Duration 92 ms    Q-T Interval 554 ms    QTC Calculation (Bezet) 543 ms    Calculated R Axis -36 degrees    Calculated T Axis -42 degrees    Diagnosis       Atrial fibrillation with slow ventricular response with premature ventricular   or aberrantly conducted complexes  Left axis deviation  Minimal voltage criteria for LVH, may be normal variant  Nonspecific ST and T wave abnormality  Abnormal ECG  No previous ECGs available  Confirmed by James Medel MD, Melquiades Miller ([id number]) on 2/8/2018 3:14:23 PM     CBC WITH AUTOMATED DIFF    Collection Time: 02/08/18  1:02 PM   Result Value Ref Range    WBC 9.8 4.1 - 11.1 K/uL    RBC 4.16 4.10 - 5.70 M/uL    HGB 12.7 12.1 - 17.0 g/dL    HCT 40.1 36.6 - 50.3 %    MCV 96.4 80.0 - 99.0 FL    MCH 30.5 26.0 - 34.0 PG    MCHC 31.7 30.0 - 36.5 g/dL    RDW 14.1 11.5 - 14.5 %    PLATELET 776 092 - 793 K/uL    MPV 12.7 8.9 - 12.9 FL    NRBC 0.0 0  WBC    ABSOLUTE NRBC 0.00 0.00 - 0.01 K/uL    NEUTROPHILS 78 (H) 32 - 75 %    LYMPHOCYTES 13 12 - 49 %    MONOCYTES 7 5 - 13 %    EOSINOPHILS 2 0 - 7 %    BASOPHILS 1 0 - 1 %    IMMATURE GRANULOCYTES 0 0.0 - 0.5 %    ABS. NEUTROPHILS 7.6 1.8 - 8.0 K/UL    ABS. LYMPHOCYTES 1.3 0.8 - 3.5 K/UL    ABS. MONOCYTES 0.6 0.0 - 1.0 K/UL    ABS. EOSINOPHILS 0.2 0.0 - 0.4 K/UL    ABS. BASOPHILS 0.1 0.0 - 0.1 K/UL    ABS. IMM. GRANS. 0.0 0.00 - 0.04 K/UL    DF AUTOMATED     METABOLIC PANEL, COMPREHENSIVE    Collection Time: 02/08/18  1:02 PM   Result Value Ref Range    Sodium 139 136 - 145 mmol/L    Potassium 5.0 3.5 - 5.1 mmol/L    Chloride 102 97 - 108 mmol/L    CO2 28 21 - 32 mmol/L    Anion gap 9 5 - 15 mmol/L    Glucose 150 (H) 65 - 100 mg/dL    BUN 32 (H) 6 - 20 MG/DL    Creatinine 1.32 (H) 0.70 - 1.30 MG/DL    BUN/Creatinine ratio 24 (H) 12 - 20      GFR est AA >60 >60 ml/min/1.73m2    GFR est non-AA 52 (L) >60 ml/min/1.73m2    Calcium 9.1 8.5 - 10.1 MG/DL    Bilirubin, total 0.5 0.2 - 1.0 MG/DL    ALT (SGPT) 18 12 - 78 U/L    AST (SGOT) 25 15 - 37 U/L    Alk.  phosphatase 62 45 - 117 U/L    Protein, total 7.5 6.4 - 8.2 g/dL    Albumin 3.4 (L) 3.5 - 5.0 g/dL    Globulin 4.1 (H) 2.0 - 4.0 g/dL    A-G Ratio 0.8 (L) 1.1 - 2.2     TROPONIN I Collection Time: 02/08/18  1:02 PM   Result Value Ref Range    Troponin-I, Qt. <0.04 <0.05 ng/mL   LACTIC ACID    Collection Time: 02/08/18  1:02 PM   Result Value Ref Range    Lactic acid 2.4 (HH) 0.4 - 2.0 MMOL/L   URINALYSIS W/MICROSCOPIC    Collection Time: 02/08/18  2:11 PM   Result Value Ref Range    Color YELLOW/STRAW      Appearance CLEAR CLEAR      Specific gravity 1.024 1.003 - 1.030      pH (UA) 5.0 5.0 - 8.0      Protein NEGATIVE  NEG mg/dL    Glucose NEGATIVE  NEG mg/dL    Ketone NEGATIVE  NEG mg/dL    Bilirubin NEGATIVE  NEG      Blood NEGATIVE  NEG      Urobilinogen 0.2 0.2 - 1.0 EU/dL    Nitrites NEGATIVE  NEG      Leukocyte Esterase NEGATIVE  NEG      WBC 0-4 0 - 4 /hpf    RBC 0-5 0 - 5 /hpf    Epithelial cells FEW FEW /lpf    Bacteria NEGATIVE  NEG /hpf   URINE CULTURE HOLD SAMPLE    Collection Time: 02/08/18  2:11 PM   Result Value Ref Range    Urine culture hold        URINE ON HOLD IN MICROBIOLOGY DEPT FOR 3 DAYS. IF UNPRESERVED URINE IS SUBMITTED, IT CANNOT BE USED FOR ADDITIONAL TESTING AFTER 24 HRS, RECOLLECTION WILL BE REQUIRED. Ct Head Wo Cont    Result Date: 2/8/2018  INDICATION: Confusion, delirium, altered consciousness. Exam: Noncontrast CT of the brain is performed with 5 mm collimation. CT dose reduction was achieved to the use of a standardized protocol tailored for this examination and automatic exposure control for dose modulation. FINDINGS: There is no acute intracranial hemorrhage, mass, mass effect or herniation. There is age-appropriate diffuse cortical atrophy with ex vacuo dilatation of the ventricular system. There are periventricular hypodensities consistent with chronic microvascular ischemic changes. There is no evidence of acute territorial infarct. The mastoid air cells are well pneumatized. There is a 1.4 cm mucous retention cyst within the visualized right maxillary sinus and a 1.5 cm mucus retention cyst with the visualized left maxillary sinus.      IMPRESSION: No acute intracranial hemorrhage or infarct. Xr Chest Port    Result Date: 2/8/2018  INDICATION: Chest pain. EXAM: A single frontal radiograph was obtained. FINDINGS: Previous studies: None are available. The heart is top normal to borderline in size. The lungs are well expanded bilaterally without infiltrate or pleural effusion or evidence of overt cardiac decompensation. The visualized bony thorax is within normal limits. IMPRESSION: Heart size top normal to borderline No acute infiltrate or overt cardiac decompensation.

## 2018-02-08 NOTE — ED PROVIDER NOTES
HPI Comments: 65 y.o. male with extensive past medical history, please see list, significant for dementia who presents from home with chief complaint of AMS and hallucinations. The patient's wife states that he seems a little more altered recently than he has in the past.  She states that he has been seeing people that are not there and asking for food after he has eaten (suggesting that he doesn't remember eating). She states that he has dementia, but she feels as if this is starting to get worse. She is concerned because his medications do not seem to be helping as well. The  Patient states that he feels ok and has no complaints for me while in triage. There are no other acute medical concerns at this time. PCP: Lexi Simpson MD    Past Medical History:  No date: Anxiety  No date: Arthritis  No date: Diabetes ([address]  No date: Falls  No date: Fatigue  No date: Hearing loss  No date: History of knee replacement  No date: Hypertension  No date: Muscle weakness  No date: Ringing in ears  No date: Skipped beats  No date: Stroke [facility])      The history is provided by the patient. No  was used. Past Medical History:   Diagnosis Date    Anxiety     Arthritis     Diabetes ([address]     Falls     Fatigue     Hearing loss     History of knee replacement     Hypertension     Muscle weakness     Ringing in ears     Skipped beats     [facility])        Past Surgical History:   Procedure Laterality Date    COLONOSCOPY N/A 8/15/2017    COLON[facility]ef[facility] HX KNEE REPLACEMENT  2016    R knee replacement    HX ORTHOPAEDIC      left knee replacement    HX PROSTATECTOMY           No family history on file. Social History     Social History    Marital status:      Spouse name: N/A    Number of children: N/A    Years of education: N/A     Occupational History    Not on file.      Social History Main Topics    Smoking status: Former Smoker    Smokeless tobacco: Former User     Quit date: 1968    Alcohol use 3.0 oz/week     5 Shots of liquor per week    Drug use: No    Sexual activity: Not on file     Other Topics Concern    Not on file     Social History Narrative         ALLERGIES: Atenolol; Dilaudid [hydromorphone]; Lisinopril; and Oxycodone    Review of Systems   Constitutional: Positive for fatigue. Negative for chills, diaphoresis and fever. HENT: Negative. Negative for congestion, rhinorrhea and trouble swallowing. Eyes: Negative. Respiratory: Negative. Negative for shortness of breath. Cardiovascular: Negative. Gastrointestinal: Negative. Negative for abdominal pain, nausea and vomiting. Endocrine: Negative. Musculoskeletal: Negative for arthralgias, myalgias, neck pain and neck stiffness. Skin: Negative. Negative for rash. Allergic/Immunologic: Negative. Neurological: Positive for tremors and weakness. Negative for dizziness, syncope and headaches. Hematological: Negative. Psychiatric/Behavioral: Positive for confusion. Vitals:    02/08/18 1218 02/08/18 1300 02/08/18 1400   BP: 112/79 131/71 100/63   Pulse: 69 64 62   Resp: 18 23 20   Temp: 97.8 °F (36.6 °C)     SpO2: 98% 94% 94%   Weight: 113.4 kg (250 lb)              Physical Exam   Constitutional: He is oriented to person, place, and time. Vital signs are normal. He appears well-developed and well-nourished. Non-toxic appearance. He does not have a sickly appearance. He does not appear ill. HENT:   Head: Normocephalic and atraumatic. Right Ear: Hearing, tympanic membrane, external ear and ear canal normal.   Left Ear: Hearing, tympanic membrane, external ear and ear canal normal.   Nose: Nose normal.   Mouth/Throat: Uvula is midline, oropharynx is clear and moist and mucous membranes are normal.   Eyes: Conjunctivae, EOM and lids are normal. Pupils are equal, round, and reactive to light.    Neck: Trachea normal, normal range of motion and full passive range of motion without pain. Neck supple. Cardiovascular: Normal rate, regular rhythm, normal heart sounds and normal pulses. Pulmonary/Chest: Effort normal and breath sounds normal. He has no decreased breath sounds. He has no wheezes. He has no rhonchi. He has no rales. Abdominal: Soft. Normal appearance and bowel sounds are normal. There is no tenderness. There is no rigidity and no guarding. Musculoskeletal: Normal range of motion. Neurological: He is alert and oriented to person, place, and time. He displays tremor. GCS eye subscore is 4. GCS verbal subscore is 5. GCS motor subscore is 6. Pt does not walk normally  Pt with equal  strength  Pt A&O x 3   Skin: Skin is warm, dry and intact. Psychiatric: He has a normal mood and affect. His speech is normal and behavior is normal. Judgment and thought content normal. Cognition and memory are normal.   Nursing note and vitals reviewed.        MDM  Number of Diagnoses or Management Options  Dehydration: new and requires workup     Amount and/or Complexity of Data Reviewed  Clinical lab tests: ordered  Tests in the radiology section of CPT®: ordered  Discuss the patient with other providers: yes Matilda Miles    Risk of Complications, Morbidity, and/or Mortality  Presenting problems: moderate  Diagnostic procedures: moderate  Management options: moderate    Patient Progress  Patient progress: improved        ED Course       Procedures    LABORATORY TESTS:  Recent Results (from the past 12 hour(s))   EKG, 12 LEAD, INITIAL    Collection Time: 02/08/18 12:40 PM   Result Value Ref Range    Ventricular Rate 58 BPM    Atrial Rate 288 BPM    QRS Duration 92 ms    Q-T Interval 554 ms    QTC Calculation (Bezet) 543 ms    Calculated R Axis -36 degrees    Calculated T Axis -42 degrees    Diagnosis       Atrial fibrillation with slow ventricular response with premature ventricular   or aberrantly conducted complexes  Left axis deviation  Minimal voltage criteria for LVH, may be normal variant  Nonspecific ST and T wave abnormality  Abnormal ECG  No previous ECGs available  Confirmed by Jyotsna Mckeon MD, Stephanie Herrera ([id number]) on 2/8/2018 3:14:23 PM     CBC WITH AUTOMATED DIFF    Collection Time: 02/08/18  1:02 PM   Result Value Ref Range    WBC 9.8 4.1 - 11.1 K/uL    RBC 4.16 4.10 - 5.70 M/uL    HGB 12.7 12.1 - 17.0 g/dL    HCT 40.1 36.6 - 50.3 %    MCV 96.4 80.0 - 99.0 FL    MCH 30.5 26.0 - 34.0 PG    MCHC 31.7 30.0 - 36.5 g/dL    RDW 14.1 11.5 - 14.5 %    PLATELET 049 670 - 897 K/uL    MPV 12.7 8.9 - 12.9 FL    NRBC 0.0 0  WBC    ABSOLUTE NRBC 0.00 0.00 - 0.01 K/uL    NEUTROPHILS 78 (H) 32 - 75 %    LYMPHOCYTES 13 12 - 49 %    MONOCYTES 7 5 - 13 %    EOSINOPHILS 2 0 - 7 %    BASOPHILS 1 0 - 1 %    IMMATURE GRANULOCYTES 0 0.0 - 0.5 %    ABS. NEUTROPHILS 7.6 1.8 - 8.0 K/UL    ABS. LYMPHOCYTES 1.3 0.8 - 3.5 K/UL    ABS. MONOCYTES 0.6 0.0 - 1.0 K/UL    ABS. EOSINOPHILS 0.2 0.0 - 0.4 K/UL    ABS. BASOPHILS 0.1 0.0 - 0.1 K/UL    ABS. IMM. GRANS. 0.0 0.00 - 0.04 K/UL    DF AUTOMATED     METABOLIC PANEL, COMPREHENSIVE    Collection Time: 02/08/18  1:02 PM   Result Value Ref Range    Sodium 139 136 - 145 mmol/L    Potassium 5.0 3.5 - 5.1 mmol/L    Chloride 102 97 - 108 mmol/L    CO2 28 21 - 32 mmol/L    Anion gap 9 5 - 15 mmol/L    Glucose 150 (H) 65 - 100 mg/dL    BUN 32 (H) 6 - 20 MG/DL    Creatinine 1.32 (H) 0.70 - 1.30 MG/DL    BUN/Creatinine ratio 24 (H) 12 - 20      GFR est AA >60 >60 ml/min/1.73m2    GFR est non-AA 52 (L) >60 ml/min/1.73m2    Calcium 9.1 8.5 - 10.1 MG/DL    Bilirubin, total 0.5 0.2 - 1.0 MG/DL    ALT (SGPT) 18 12 - 78 U/L    AST (SGOT) 25 15 - 37 U/L    Alk.  phosphatase 62 45 - 117 U/L    Protein, total 7.5 6.4 - 8.2 g/dL    Albumin 3.4 (L) 3.5 - 5.0 g/dL    Globulin 4.1 (H) 2.0 - 4.0 g/dL    A-G Ratio 0.8 (L) 1.1 - 2.2     TROPONIN I    Collection Time: 02/08/18  1:02 PM   Result Value Ref Range    Troponin-I, Qt. <0.04 <0.05 ng/mL   LACTIC ACID    Collection Time: 02/08/18  1:02 PM   Result Value Ref Range    Lactic acid 2.4 (HH) 0.4 - 2.0 MMOL/L   URINALYSIS W/MICROSCOPIC    Collection Time: 02/08/18  2:11 PM   Result Value Ref Range    Color YELLOW/STRAW      Appearance CLEAR CLEAR      Specific gravity 1.024 1.003 - 1.030      pH (UA) 5.0 5.0 - 8.0      Protein NEGATIVE  NEG mg/dL    Glucose NEGATIVE  NEG mg/dL    Ketone NEGATIVE  NEG mg/dL    Bilirubin NEGATIVE  NEG      Blood NEGATIVE  NEG      Urobilinogen 0.2 0.2 - 1.0 EU/dL    Nitrites NEGATIVE  NEG      Leukocyte Esterase NEGATIVE  NEG      WBC 0-4 0 - 4 /hpf    RBC 0-5 0 - 5 /hpf    Epithelial cells FEW FEW /lpf    Bacteria NEGATIVE  NEG /hpf   URINE CULTURE HOLD SAMPLE    Collection Time: 02/08/18  2:11 PM   Result Value Ref Range    Urine culture hold        URINE ON HOLD IN MICROBIOLOGY DEPT FOR 3 DAYS. IF UNPRESERVED URINE IS SUBMITTED, IT CANNOT BE USED FOR ADDITIONAL TESTING AFTER 24 HRS, RECOLLECTION WILL BE REQUIRED. IMAGING RESULTS:    CT Results  (Last 48 hours)               02/08/18 1352  CT HEAD WO CONT Final result    Impression:  IMPRESSION: No acute intracranial hemorrhage or infarct. Narrative:  INDICATION: Confusion, delirium, altered consciousness. Exam: Noncontrast CT of the brain is performed with 5 mm collimation. CT dose reduction was achieved to the use of a standardized protocol tailored   for this examination and automatic exposure control for dose modulation. FINDINGS: There is no acute intracranial hemorrhage, mass, mass effect or   herniation. There is age-appropriate diffuse cortical atrophy with ex vacuo   dilatation of the ventricular system. There are periventricular hypodensities   consistent with chronic microvascular ischemic changes. There is no evidence of   acute territorial infarct. The mastoid air cells are well pneumatized.  There is   a 1.4 cm mucous retention cyst within the visualized right maxillary sinus and a   1.5 cm mucus retention cyst with the visualized left maxillary sinus. PFT Results  (Last 48 hours)    None        Echo Results  (Last 48 hours)    None        CXR Results  (Last 48 hours)               02/08/18 1246  XR CHEST PORT Final result    Impression:  IMPRESSION:       Heart size top normal to borderline       No acute infiltrate or overt cardiac decompensation. Narrative:  INDICATION: Chest pain. EXAM:       A single frontal radiograph was obtained. FINDINGS:       Previous studies: None are available. The heart is top normal to borderline in size. The lungs are well expanded   bilaterally without infiltrate or pleural effusion or evidence of overt cardiac   decompensation. The visualized bony thorax is within normal limits. VENOUS DOPPLER results  (Last 48 hours)    None            MEDICATIONS GIVEN:  Medications   sodium chloride 0.9 % bolus infusion 1,000 mL (1,000 mL IntraVENous New Bag 2/8/18 2381)       IMPRESSION:  1. Dehydration        PLAN:  1. F/U with PCP  2. Push fluids  Return to ED if worse    Discharge Note  3:17 PM  The patient is ready for discharge. The patient's signs, symptoms, diagnosis, and discharge instructions have been discussed and the patient has conveyed their understanding. The patient is to follow up as recommended or return to the ER should their symptoms worsen. Plan has been discussed and the patient is in agreement. Jose Ramey Pagé FNP-BC.

## 2018-02-14 LAB
BACTERIA SPEC CULT: NORMAL
SERVICE CMNT-IMP: NORMAL

## 2018-02-21 ENCOUNTER — OFFICE VISIT (OUTPATIENT)
Dept: NEUROLOGY | Age: 78
End: 2018-02-21

## 2018-02-21 VITALS
DIASTOLIC BLOOD PRESSURE: 86 MMHG | BODY MASS INDEX: 35 KG/M2 | WEIGHT: 250 LBS | SYSTOLIC BLOOD PRESSURE: 132 MMHG | HEIGHT: 71 IN

## 2018-02-21 DIAGNOSIS — F02.80 LATE ONSET ALZHEIMER'S DISEASE WITHOUT BEHAVIORAL DISTURBANCE (HCC): ICD-10-CM

## 2018-02-21 DIAGNOSIS — R13.19 ESOPHAGEAL DYSPHAGIA: ICD-10-CM

## 2018-02-21 DIAGNOSIS — G30.1 LATE ONSET ALZHEIMER'S DISEASE WITHOUT BEHAVIORAL DISTURBANCE (HCC): ICD-10-CM

## 2018-02-21 DIAGNOSIS — R41.82 ALTERED MENTAL STATUS, UNSPECIFIED ALTERED MENTAL STATUS TYPE: Primary | ICD-10-CM

## 2018-02-21 DIAGNOSIS — R26.9 GAIT DISORDER: ICD-10-CM

## 2018-02-21 RX ORDER — ESCITALOPRAM OXALATE 20 MG/1
TABLET ORAL
Qty: 135 TAB | Refills: 1 | Status: SHIPPED | OUTPATIENT
Start: 2018-02-21

## 2018-02-21 RX ORDER — RISPERIDONE 0.5 MG/1
0.5 TABLET, FILM COATED ORAL
Qty: 90 TAB | Refills: 1 | Status: SHIPPED | OUTPATIENT
Start: 2018-02-21

## 2018-02-21 RX ORDER — DEXTROMETHORPHAN HYDROBROMIDE, GUAIFENESIN 5; 100 MG/5ML; MG/5ML
650 LIQUID ORAL EVERY 8 HOURS
COMMUNITY
End: 2018-04-20

## 2018-02-21 RX ORDER — DIAZEPAM 5 MG/1
TABLET ORAL
Qty: 1 TAB | Refills: 0 | Status: SHIPPED | OUTPATIENT
Start: 2018-02-21 | End: 2018-04-20

## 2018-02-21 RX ORDER — CELECOXIB 200 MG/1
CAPSULE ORAL 2 TIMES DAILY
COMMUNITY

## 2018-02-21 NOTE — PATIENT INSTRUCTIONS
10 [facility]   Statement to Patients  April 1, 2014      In an effort to ensure the large volume of patient prescription refills is processed in the most efficient and expeditious manner, we are asking our patients to assist us by calling your Pharmacy for all prescription refills, this will include also your  Mail Order Pharmacy. The pharmacy will contact our office electronically to continue the refill process. Please do not wait until the last minute to call your pharmacy. We need at least 48 hours (2days) to fill prescriptions. We also encourage you to call your pharmacy before going to  your prescription to make sure it is ready. With regard to controlled substance prescription refill requests (narcotic refills) that need to be picked up at our office, we ask your cooperation by providing us with at least 72 hours (3days) notice that you will need a refill. We will not refill narcotic prescription refill requests after 4:00pm on any weekday, Monday through Thursday, or after 2:00pm on Fridays, or on the weekends. We encourage everyone to explore another way of getting your prescription refill request processed using [employer], our patient web portal through our electronic medical record system. [employer] is an efficient and effective way to communicate your medication request directly to the office and  downloadable as an francisco on your smart phone . [employer] also features a review functionality that allows you to view your medication list as well as leave messages for your physician. Are you ready to get connected? If so please review the attatched instructions or speak to any of our staff to get you set up right away! Thank you so much for your cooperation. Should you have any questions please contact our Practice Administrator.     The Physicians and Staff,  [facility]

## 2018-02-21 NOTE — MR AVS SNAPSHOT
Paco Razo 
 
 
 [place] [address] [place] [address] [phone] Patient: Kai Avery MRN: [id number] Visit Information Date & Time Provider Department Dept. Phone Encounter #  
 2018  2:00 PM JOELLE Mares \[facility] [place] [facility]\"" Neurology [facility] [phone] [id number] Your Appointments 3/15/2018 10:00 AM  
New Patient with Gabrielle Spruce, PsyD [address] ([address]) Appt Note: re evaluation for testing/ Rafael Chucho [place] [address] [place] [address] [phone]  
  
   
 [place] [address] [id number] [address] 2018  1:40 PM  
Follow Up with Hernan Echevarria MD  
[facility]) Appt Note: memory loss diana  
 [address] [place] [address] [phone]  
  
   
 [place] [address] [address] [address] Upcoming Health Maintenance Date Due DTaP/Tdap/Td series (1 - Tdap) 10/9/1960 ZOSTER VACCINE AGE 60> 1999 GLAUCOMA SCREENING Q2Y 10/9/2004 Pneumococcal 65+ Low/Medium Risk (1 of 2 - PCV13) 10/9/2004 MEDICARE YEARLY EXAM 10/9/2004 Influenza Age 5 to Adult 2017 Allergies as of 2018  Review Complete On: 2018 By: Aurelia Diehl Severity Noted Reaction Type Reactions Atenolol  2016    Unknown (comments) Dilaudid [Hydromorphone]  2016    Unknown (comments) Lisinopril  2016    Unknown (comments) Oxycodone  2016    Unknown (comments) Current Immunizations  Never Reviewed No immunizations on file. Not reviewed this visit You Were Diagnosed With   
  
 Codes Comments  Altered mental status, unspecified altered mental status type    -  Primary ICD-10-CM: R41.82 
ICD-9-CM: 780.97   
 Late onset Alzheimer's disease without behavioral disturbance     ICD-10-CM: G30.1, F02.80 ICD-9-CM: 331.0, 294.10 Gait disorder     ICD-10-CM: R26.9 ICD-9-CM: 781.2 Esophageal dysphagia     ICD-10-CM: R13.10 ICD-9-CM: 787.20 Vitals BP Height(growth percentile) Weight(growth percentile) BMI Smoking Status 132/86 5' 11\" (1.803 m) 250 lb (113.4 kg) 34.87 kg/m2 Former Smoker BMI and BSA Data Body Mass Index Body Surface Area 34.87 kg/m 2 2.38 m 2 Preferred Pharmacy Pharmacy Name [address] [facility] [phone] Your Updated Medication List  
  
   
This list is accurate as of 2/21/18  2:38 PM.  Always use your most recent med list.  
  
  
  
  
 acetaminophen 650 mg Tber Commonly known as:  TYLENOL Take 650 mg by mouth every eight (8) hours. allopurinol 300 mg tablet Commonly known as:  ZYLOPRIM  
  
 celecoxib 200 mg capsule Commonly known as:  CELEBREX Take  by mouth two (2) times a day. cholecalciferol 1,000 unit Cap Commonly known as:  VITAMIN D3 Take  by mouth daily. diazePAM 5 mg tablet Commonly known as:  VALIUM Take tablet 30-45 minutes before MRI. Must have  drive home after. dicyclomine 10 mg capsule Commonly known as:  BENTYL Take 10 mg by mouth daily. escitalopram oxalate 20 mg tablet Commonly known as:  Margarito Gage Take 1.5 tablets by mouth daily  
  
 fexofenadine 180 mg tablet Commonly known as:  Carlos Fontan Take  by mouth. glipiZIDE SR 5 mg CR tablet Commonly known as:  GLUCOTROL XL  
  
 hydroCHLOROthiazide 25 mg tablet Commonly known as:  HYDRODIURIL  
  
 imipramine 25 mg tablet Commonly known as:  TOFRANIL Take 25 mg by mouth three (3) times daily. metoprolol tartrate 25 mg tablet Commonly known as:  LOPRESSOR  
  
 NAMZARIC 28-10 mg Cspx Generic drug:  memantine-donepezil TAKE 1 CAPSULE DAILY risperiDONE 0.5 mg tablet Commonly known as:  RisperDAL Take 1 Tab by mouth nightly. rivaroxaban 20 mg Tab tablet Commonly known as:  Hedii Culpon Take  by mouth daily. simvastatin 20 mg tablet Commonly known as:  ZOCOR SITagliptin-metFORMIN 50-1,000 mg per tablet Commonly known as:  Yari Stephenson Take 1 Tab by mouth two (2) times daily (with meals). Prescriptions Printed Refills  
 diazePAM (VALIUM) 5 mg tablet 0 Sig: Take tablet 30-45 minutes before MRI. Must have  drive home after. Class: Print Prescriptions Sent to Pharmacy Refills  
 risperiDONE (RISPERDAL) 0.5 mg tablet 1 Sig: Take 1 Tab by mouth nightly. Class: Normal  
 Pharmacy: [address], [address] Ph #: [phone] Route: Oral  
 escitalopram oxalate (LEXAPRO) 20 mg tablet 1 Sig: Take 1.5 tablets by mouth daily Class: Normal  
 Pharmacy: [address], [address] Ph #: [phone] To-Do List   
 02/21/2018 Imaging:  DUPLEX CAROTID BILATERAL AMB NEURO   
  
 02/21/2018 Neurology:  EEG AMB NEURO   
  
 02/21/2018 Imaging:  MRI BRAIN WO CONT   
  
 02/21/2018 Imaging:  XR SWALLOW FUNC VIDEO Referral Information Referral ID Referred By Referred To  
  
 [id number] [facility] Not Available Visits Status Start Date End Date 1 New Request 2/21/18 2/21/19 If your referral has a status of pending review or denied, additional information will be sent to support the outcome of this decision. Patient Instructions PRESCRIPTION REFILL POLICY [facility] Statement to Patients April 1, 2014 In an effort to ensure the large volume of patient prescription refills is processed in the most efficient and expeditious manner, we are asking our patients to assist us by calling your Pharmacy for all prescription refills, this will include also your  Mail Order Pharmacy. The pharmacy will contact our office electronically to continue the refill process. Please do not wait until the last minute to call your pharmacy. We need at least 48 hours (2days) to fill prescriptions. We also encourage you to call your pharmacy before going to  your prescription to make sure it is ready. With regard to controlled substance prescription refill requests (narcotic refills) that need to be picked up at our office, we ask your cooperation by providing us with at least 72 hours (3days) notice that you will need a refill. We will not refill narcotic prescription refill requests after 4:00pm on any weekday, Monday through Thursday, or after 2:00pm on Fridays, or on the weekends. We encourage everyone to explore another way of getting your prescription refill request processed using xaitment, our patient web portal through our electronic medical record system. xaitment is an efficient and effective way to communicate your medication request directly to the office and  downloadable as an francisco on your smart phone . xaitment also features a review functionality that allows you to view your medication list as well as leave messages for your physician. Are you ready to get connected? If so please review the attatched instructions or speak to any of our staff to get you set up right away! Thank you so much for your cooperation. Should you have any questions please contact our Practice Administrator. The Physicians and Staff,  [facility] SERVICES! [facility] introduces xaitment patient portal. Now you can access parts of your medical record, email your doctor's office, and request medication refills online. 1. In your internet browser, go to [email or web address] 2. Click on the First Time User? Click Here link in the Sign In box.  You will see the New Member Sign Up page. 3. Enter your InOpen Access Code exactly as it appears below. You will not need to use this code after youve completed the sign-up process. If you do not sign up before the expiration date, you must request a new code. · InOpen Access Code: [email or web address] Expires: 5/22/2018  2:38 PM 
 
4. Enter the last four digits of your Social Security Number (xxxx) and Date of Birth (mm/dd/yyyy) as indicated and click Submit. You will be taken to the next sign-up page. 5. Create a InOpen ID. This will be your InOpen login ID and cannot be changed, so think of one that is secure and easy to remember. 6. Create a InOpen password. You can change your password at any time. 7. Enter your Password Reset Question and Answer. This can be used at a later time if you forget your password. 8. Enter your e-mail address. You will receive e-mail notification when new information is available in [address]. 9. Click Sign Up. You can now view and download portions of your medical record. 10. Click the Download Summary menu link to download a portable copy of your medical information. If you have questions, please visit the Frequently Asked Questions section of the InOpen website. Remember, InOpen is NOT to be used for urgent needs. For medical emergencies, dial 911. Now available from your iPhone and Android! Please provide this summary of care documentation to your next provider. Your primary care clinician is listed as Al Wong. If you have any questions after today's visit, please call [phone].

## 2018-02-21 NOTE — PROGRESS NOTES
Patient is here with his wife for hospital follow up. Needs refills of lexapro and risperidone. Patient went to [facility] for slurred speech and being delirious. [facility] told them is was dehydration and not a TIA. Patient is moving into [facility] assisted living.

## 2018-02-22 NOTE — PROGRESS NOTES
Yazan Prieot is a 65 y.o. male who presents with the following  Chief Complaint   Patient presents with   [facility] Follow Up       HPI Patient comes in with wife for a follow up for hospital ED visit. He developed acute mental status changes and was hallucinating. His wife states the night before he was having strage conversations and the next morning had slurred speech hallucination and delusions. The slurred speech lasted for about 12 hours or so. She states he was saying that he fought pirates for neighbor cattle were coming through kitchen. He is to move to [address] [place] [address] this coming week to help with his care and dementia care. Currently on Namzaric and doing well with this. Risperdal 0.5 mg nightly has been helping him sleep and combatted against some hallucinations right off the bat when starting this. He is also on Lexapro. ED found some dehydration and they state blamed all the symptoms on this. They also state he has some trouble with swallowing and coughing and choking on certain food and liquids. More so when he is swallowing. Allergies   Allergen Reactions    Atenolol Unknown (comments)    Dilaudid [Hydromorphone] Unknown (comments)    Lisinopril Unknown (comments)    Oxycodone Unknown (comments)       Current Outpatient Prescriptions   Medication Sig    celecoxib (CELEBREX) 200 mg capsule Take  by mouth two (2) times a day.  acetaminophen (TYLENOL) 650 mg TbER Take 650 mg by mouth every eight (8) hours.  risperiDONE (RISPERDAL) 0.5 mg tablet Take 1 Tab by mouth nightly.  escitalopram oxalate (LEXAPRO) 20 mg tablet Take 1.5 tablets by mouth daily    diazePAM (VALIUM) 5 mg tablet Take tablet 30-45 minutes before MRI. Must have  drive home after.  dicyclomine (BENTYL) 10 mg capsule Take 10 mg by mouth daily.     NAMZARIC 28-10 mg CSpX TAKE 1 CAPSULE DAILY    allopurinol (ZYLOPRIM) 300 mg tablet     glipiZIDE SR (GLUCOTROL) 5 mg CR tablet     hydrochlorothiazide (HYDRODIURIL) 25 mg tablet     metoprolol (LOPRESSOR) 25 mg tablet     simvastatin (ZOCOR) 20 mg tablet     fexofenadine (ALLEGRA) 180 mg tablet Take  by mouth.  imipramine (TOFRANIL) 25 mg tablet Take 25 mg by mouth three (3) times daily.  sitaGLIPtin-metFORMIN (JANUMET) 50-1,000 mg per tablet Take 1 Tab by mouth two (2) times daily (with meals).  rivaroxaban (XARELTO) 20 mg tab tablet Take  by mouth daily.  Cholecalciferol, Vitamin D3, 1,000 unit cap Take  by mouth daily. No current facility-administered medications for this visit. History   Smoking Status    Former Smoker   Smokeless Tobacco    Former User    Quit date: 1968       Past Medical History:   Diagnosis Date    Anxiety     Arthritis     Diabetes ([address]     Falls     Fatigue     Hearing loss     History of knee replacement     Hypertension     Muscle weakness     Ringing in ears     Skipped beats     Stroke [facility])        Past Surgical History:   Procedure Laterality Date    COLONOSCOPY N/A 8/15/2017    COLONOSCOPY performed by Ashely Sprague MD at [address] [place] [address] HX KNEE REPLACEMENT  2016    R knee replacement    HX ORTHOPAEDIC      left knee replacement    HX PROSTATECTOMY         History reviewed. No pertinent family history. Social History     Social History    Marital status:      Spouse name: N/A    Number of children: N/A    Years of education: N/A     Social History Main Topics    Smoking status: Former Smoker    Smokeless tobacco: Former User     Quit date: 1968    Alcohol use 3.0 oz/week     5 Shots of liquor per week    Drug use: No    Sexual activity: Not Asked     Other Topics Concern    None     Social History Narrative       Review of Systems   Constitutional: Positive for malaise/fatigue. Eyes: Negative for blurred vision, double vision and photophobia. Respiratory: Negative for shortness of breath and wheezing.     Cardiovascular: Negative for chest pain and palpitations. Gastrointestinal: Negative for nausea and vomiting. Musculoskeletal: Negative for falls. Neurological: Positive for weakness. Negative for dizziness, tingling, seizures, loss of consciousness and headaches. Psychiatric/Behavioral: Positive for hallucinations and memory loss. Negative for depression. Remainder of comprehensive review is negative. Physical Exam :    Visit Vitals    /86    Ht 5' 11\" (1.803 m)    Wt 113.4 kg (250 lb)    BMI 34.87 kg/m2       General: Well defined, nourished, and groomed individual in no acute distress.    Musculoskeletal: Extremities revealed no edema and had full range of motion of joints.    Psych: Good mood and bright affect    NEUROLOGICAL EXAMINATION:    Mental Status: Alert and oriented to person, place, and time    Cranial Nerves:    II, III, IV, VI: Visual acuity grossly intact. Visual fields are normal.    Pupils are equal, round, and reactive to light and accommodation.        V-XII: Hearing is grossly intact. Facial features are symmetric, with normal sensation and strength. Motor Examination: Normal tone, bulk, and strength, 4/5 muscle strength throughout. Coordination: Finger to nose was normal.     Gait and Station: wheelchair     Reflexes: DTRs 1+ throughout. Results for orders placed or performed during the hospital encounter of 02/08/18   URINE CULTURE HOLD SAMPLE   Result Value Ref Range    Urine culture hold        URINE ON HOLD IN MICROBIOLOGY DEPT FOR 3 DAYS. IF UNPRESERVED URINE IS SUBMITTED, IT CANNOT BE USED FOR ADDITIONAL TESTING AFTER 24 HRS, RECOLLECTION WILL BE REQUIRED.    CULTURE, BLOOD   Result Value Ref Range    Special Requests: NO SPECIAL REQUESTS      Culture result: NO GROWTH 6 DAYS     CBC WITH AUTOMATED DIFF   Result Value Ref Range    WBC 9.8 4.1 - 11.1 K/uL    RBC 4.16 4.10 - 5.70 M/uL    HGB 12.7 12.1 - 17.0 g/dL    HCT 40.1 36.6 - 50.3 %    MCV 96.4 80.0 - 99.0 FL    MCH 30.5 26.0 - 34.0 PG    MCHC 31.7 30.0 - 36.5 g/dL    RDW 14.1 11.5 - 14.5 %    PLATELET 505 645 - 835 K/uL    MPV 12.7 8.9 - 12.9 FL    NRBC 0.0 0  WBC    ABSOLUTE NRBC 0.00 0.00 - 0.01 K/uL    NEUTROPHILS 78 (H) 32 - 75 %    LYMPHOCYTES 13 12 - 49 %    MONOCYTES 7 5 - 13 %    EOSINOPHILS 2 0 - 7 %    BASOPHILS 1 0 - 1 %    IMMATURE GRANULOCYTES 0 0.0 - 0.5 %    ABS. NEUTROPHILS 7.6 1.8 - 8.0 K/UL    ABS. LYMPHOCYTES 1.3 0.8 - 3.5 K/UL    ABS. MONOCYTES 0.6 0.0 - 1.0 K/UL    ABS. EOSINOPHILS 0.2 0.0 - 0.4 K/UL    ABS. BASOPHILS 0.1 0.0 - 0.1 K/UL    ABS. IMM. GRANS. 0.0 0.00 - 0.04 K/UL    DF AUTOMATED     METABOLIC PANEL, COMPREHENSIVE   Result Value Ref Range    Sodium 139 136 - 145 mmol/L    Potassium 5.0 3.5 - 5.1 mmol/L    Chloride 102 97 - 108 mmol/L    CO2 28 21 - 32 mmol/L    Anion gap 9 5 - 15 mmol/L    Glucose 150 (H) 65 - 100 mg/dL    BUN 32 (H) 6 - 20 MG/DL    Creatinine 1.32 (H) 0.70 - 1.30 MG/DL    BUN/Creatinine ratio 24 (H) 12 - 20      GFR est AA >60 >60 ml/min/1.73m2    GFR est non-AA 52 (L) >60 ml/min/1.73m2    Calcium 9.1 8.5 - 10.1 MG/DL    Bilirubin, total 0.5 0.2 - 1.0 MG/DL    ALT (SGPT) 18 12 - 78 U/L    AST (SGOT) 25 15 - 37 U/L    Alk.  phosphatase 62 45 - 117 U/L    Protein, total 7.5 6.4 - 8.2 g/dL    Albumin 3.4 (L) 3.5 - 5.0 g/dL    Globulin 4.1 (H) 2.0 - 4.0 g/dL    A-G Ratio 0.8 (L) 1.1 - 2.2     URINALYSIS W/MICROSCOPIC   Result Value Ref Range    Color YELLOW/STRAW      Appearance CLEAR CLEAR      Specific gravity 1.024 1.003 - 1.030      pH (UA) 5.0 5.0 - 8.0      Protein NEGATIVE  NEG mg/dL    Glucose NEGATIVE  NEG mg/dL    Ketone NEGATIVE  NEG mg/dL    Bilirubin NEGATIVE  NEG      Blood NEGATIVE  NEG      Urobilinogen 0.2 0.2 - 1.0 EU/dL    Nitrites NEGATIVE  NEG      Leukocyte Esterase NEGATIVE  NEG      WBC 0-4 0 - 4 /hpf    RBC 0-5 0 - 5 /hpf    Epithelial cells FEW FEW /lpf    Bacteria NEGATIVE  NEG /hpf   TROPONIN I   Result Value Ref Range    Troponin-I, Qt. <0.04 <0.05 ng/mL   LACTIC ACID   Result Value Ref Range    Lactic acid 2.4 (HH) 0.4 - 2.0 MMOL/L   EKG, 12 LEAD, INITIAL   Result Value Ref Range    Ventricular Rate 58 BPM    Atrial Rate 288 BPM    QRS Duration 92 ms    Q-T Interval 554 ms    QTC Calculation (Bezet) 543 ms    Calculated R Axis -36 degrees    Calculated T Axis -42 degrees    Diagnosis       Atrial fibrillation with slow ventricular response with premature ventricular   or aberrantly conducted complexes  Left axis deviation  Minimal voltage criteria for LVH, may be normal variant  Nonspecific ST and T wave abnormality  Abnormal ECG  No previous ECGs available  Confirmed by Giorgi Reyna MD, Mehnaz Granados ([id number]) on 2/8/2018 3:14:23 PM         Orders Placed This Encounter    MRI BRAIN WO CONT     Standing Status:   Future     Standing Expiration Date:   3/21/2019     Order Specific Question:   Is Patient Allergic to Contrast Dye? Answer:   No    DUPLEX CAROTID BILATERAL AMB NEURO ([id number])     Standing Status:   Future     Standing Expiration Date:   2/21/2019     Order Specific Question:   Reason for Exam:     Answer:   AMS, memory los    XR SWALLOW FUNC VIDEO     Standing Status:   Future     Standing Expiration Date:   3/21/2019     Order Specific Question:   Reason for Exam     Answer:   dysphagia    EEG AMB NEURO ([id number])     Standing Status:   Future     Standing Expiration Date:   8/21/2018     Order Specific Question:   Reason for Exam:     Answer: AMS    celecoxib (CELEBREX) 200 mg capsule     Sig: Take  by mouth two (2) times a day.  acetaminophen (TYLENOL) 650 mg TbER     Sig: Take 650 mg by mouth every eight (8) hours.  risperiDONE (RISPERDAL) 0.5 mg tablet     Sig: Take 1 Tab by mouth nightly. Dispense:  90 Tab     Refill:  1    escitalopram oxalate (LEXAPRO) 20 mg tablet     Sig: Take 1.5 tablets by mouth daily     Dispense:  135 Tab     Refill:  1    diazePAM (VALIUM) 5 mg tablet     Sig: Take tablet 30-45 minutes before MRI.  Must have  drive home after. Dispense:  1 Tab     Refill:  0       1. Altered mental status, unspecified altered mental status type    2. Late onset Alzheimer's disease without behavioral disturbance    3. Gait disorder    4. Esophageal dysphagia        Follow-up Disposition: Not on File    New onset of acute hallucination, slurred speech, and gait abnormality. Need an MRI of the brain to look at causes of his acute symptoms including acute stroke, tumor, lesions or other etiology. We will also get an EEG to look for epileptiform and a carotid doppler to look at stenosis. We will also refer to get a swallowing screen to evaluate for psychological causes of dysphagia or memory or other etiology as this happens on a very frequent basis. He will keep Namzaric 28-10 for his memory. Can also increase Risperdal if needed but they will defer. Call if anything changes. He should feel better once adjusted at [facility]. This should benefit him mentally and physically.          This note will not be viewable in [employer]

## 2018-03-07 ENCOUNTER — OFFICE VISIT (OUTPATIENT)
Dept: NEUROLOGY | Age: 78
End: 2018-03-07

## 2018-03-07 DIAGNOSIS — G30.1 LATE ONSET ALZHEIMER'S DISEASE WITHOUT BEHAVIORAL DISTURBANCE (HCC): ICD-10-CM

## 2018-03-07 DIAGNOSIS — R26.9 GAIT DISORDER: ICD-10-CM

## 2018-03-07 DIAGNOSIS — F02.80 LATE ONSET ALZHEIMER'S DISEASE WITHOUT BEHAVIORAL DISTURBANCE (HCC): Primary | ICD-10-CM

## 2018-03-07 DIAGNOSIS — R41.82 ALTERED MENTAL STATUS, UNSPECIFIED ALTERED MENTAL STATUS TYPE: ICD-10-CM

## 2018-03-07 DIAGNOSIS — I65.23 BILATERAL CAROTID ARTERY STENOSIS: Primary | ICD-10-CM

## 2018-03-07 DIAGNOSIS — G30.1 LATE ONSET ALZHEIMER'S DISEASE WITHOUT BEHAVIORAL DISTURBANCE (HCC): Primary | ICD-10-CM

## 2018-03-07 DIAGNOSIS — F02.80 LATE ONSET ALZHEIMER'S DISEASE WITHOUT BEHAVIORAL DISTURBANCE (HCC): ICD-10-CM

## 2018-03-09 ENCOUNTER — HOSPITAL ENCOUNTER (OUTPATIENT)
Dept: MRI IMAGING | Age: 78
Discharge: HOME | End: 2018-03-09
Attending: NURSE PRACTITIONER
Payer: MEDICARE

## 2018-03-09 DIAGNOSIS — G30.1 LATE ONSET ALZHEIMER'S DISEASE WITHOUT BEHAVIORAL DISTURBANCE (HCC): ICD-10-CM

## 2018-03-09 DIAGNOSIS — R41.82 ALTERED MENTAL STATUS, UNSPECIFIED ALTERED MENTAL STATUS TYPE: ICD-10-CM

## 2018-03-09 DIAGNOSIS — F02.80 LATE ONSET ALZHEIMER'S DISEASE WITHOUT BEHAVIORAL DISTURBANCE (HCC): ICD-10-CM

## 2018-03-09 DIAGNOSIS — R26.9 GAIT DISORDER: ICD-10-CM

## 2018-03-09 PROCEDURE — 70551 MRI BRAIN STEM W/O DYE: CPT

## 2018-03-12 ENCOUNTER — HOSPITAL ENCOUNTER (OUTPATIENT)
Dept: GENERAL RADIOLOGY | Age: 78
Discharge: HOME | End: 2018-03-12
Attending: NURSE PRACTITIONER
Payer: MEDICARE

## 2018-03-12 DIAGNOSIS — G30.1 LATE ONSET ALZHEIMER'S DISEASE WITHOUT BEHAVIORAL DISTURBANCE (HCC): ICD-10-CM

## 2018-03-12 DIAGNOSIS — F02.80 LATE ONSET ALZHEIMER'S DISEASE WITHOUT BEHAVIORAL DISTURBANCE (HCC): ICD-10-CM

## 2018-03-12 DIAGNOSIS — R41.82 ALTERED MENTAL STATUS, UNSPECIFIED ALTERED MENTAL STATUS TYPE: ICD-10-CM

## 2018-03-12 DIAGNOSIS — R13.19 ESOPHAGEAL DYSPHAGIA: ICD-10-CM

## 2018-03-12 PROCEDURE — 74230 X-RAY XM SWLNG FUNCJ C+: CPT

## 2018-03-12 PROCEDURE — G8996 SWALLOW CURRENT STATUS: HCPCS

## 2018-03-12 PROCEDURE — G8998 SWALLOW D/C STATUS: HCPCS

## 2018-03-12 PROCEDURE — 92611 MOTION FLUOROSCOPY/SWALLOW: CPT

## 2018-03-12 PROCEDURE — G8997 SWALLOW GOAL STATUS: HCPCS

## 2018-03-12 NOTE — PROGRESS NOTES
[address] [address] [facility]    Speech Pathology Modified barium swallow Study with cms g codes  Patient: Malka Menchaca (80 y.o. male)  Date: 3/12/2018  Referring Provider:  Dr. Harleen Perdue:   Patient with chief complaint of intermittent globus sensation and intermittent coughing with thin liquids. Patient states that the globus sensation occurs with tougher solids such as with bread and steaks. This occurs every day, but not during every meal. Patient complains of coughing with thin liquids, especially when drinking cold liquids. These complaints began about 2 years ago. Patient reports he eats a regular/thin liquid diet at baseline and reports no problems with taking meds in pill form. Patient denies history of GERD, PNA, COPD, or smoking. Patient reports he had a TIA 2 years ago. Patient reports no previous evaluations or treatments by an SLP prior to this date. Patient with history of late onset Alzheimer's disease without behavioral distourbance, as per chart. OBJECTIVE:   Past Medical History:   Past Medical History:   Diagnosis Date    Anxiety     Arthritis     Diabetes ([address]     Falls     Fatigue     Hearing loss     History of knee replacement     Hypertension     Muscle weakness     Ringing in ears     Skipped beats     Stroke [facility])      Past Surgical History:   Procedure Laterality Date    COLONOSCOPY N/A 8/15/2017    COLONOSCOPY performed by Marleni Johnson MD at [facility] HX KNEE REPLACEMENT  2016    R knee replacement    HX ORTHOPAEDIC      left knee replacement    HX PROSTATECTOMY       Current Dietary Status:  Regular/thin liquid  Radiologist: Dr. Zenon Hammonds: Lateral, fluoro  Patient Position: upright in hausted chair    Trial 1:   Consistency Presented: Thin liquid;Puree; Solid   How Presented: SLP-fed/presented;Self-fed/presented;Cup/sip;Spoon;Straw;Successive swallows       Bolus Acceptance: No impairment   Bolus Formation/Control: No impairment (?tongue temor):     Propulsion: No impairment   Oral Residue: None   Initiation of Swallow: Triggered at pyriform sinus(es) (for thin liquid; timely for purees and solid)   Timing: Pooling 1-5 sec   Penetration: During swallow; To laryngeal vestibule (due to lori, cleared with the force of the swallow)   Aspiration/Timing: No evidence of aspiration   Pharyngeal Clearance: Vallecular residue;10-50% (collection; cleared with thin liquid wash)                       Decreased Tongue Base Retraction?: Yes  Laryngeal Elevation: WFL (within functional limits)  Aspiration/Penetration Score: 2 (Penetration/No residue-Contrast enters the airway penetrates, remains above the folds/cords, and is cleared)  Pharyngeal Symmetry: Not assessed  Pharyngeal-Esophageal Segment: No impairment  Pharyngeal Dysfunction: Decreased tongue base retraction, decreased epiglottic inversion    Oral Phase Severity: No impairment  Pharyngeal Phase Severity: Mild but functional for age    In compliance with CMSs Claims Based Outcome Reporting, the following G-code set was chosen for this patient based the use of the NOMS functional outcome to quantify this patient's level of swallowing impairment. Using the NOMS, the patient was determined to be at level 7 for swallow function which correlates with the CH= 0% level of severity. Based on the objective assessment provided within this note, the current, goal, and discharge g-codes are as follows:    Swallow  Swallowing:   Swallow Current Status CH= 0%   Swallow Goal Status CH= 0%   Swallow D/C Status CH= 0%      NOMS Swallowing Levels:  Level 1 (CN): NPO  Level 2 (CM): NPO but takes consistency in therapy  Level 3 (CL): Takes less than 50% of nutrition p.o. and continues with nonoral feedings; and/or safe with mod cues; and/or max diet restriction  Level 4 (CK):  Safe swallow but needs mod cues; and/or mod diet restriction; and/or still requires some nonoral feeding/supplements  Level 5 (CJ): Safe swallow with min diet restriction; and/or needs min cues  Level 6 (CI): Independent with p.o.; rare cues; usually self cues; may need to avoid some foods or needs extra time  Level 7 ([address] [place] [address]): Independent for all p.o.  TAMAR. (2003). National Outcomes Measurement System (NOMS): Adult Speech-Language Pathology User's Guide. ASSESSMENT :  Based on the objective data described below, the patient presents with a functional oral swallow and mild pharyngeal dysphagia that is overall WFL for age. Patient with delayed initiation of the swallow at the level of the pyriforms, decreased epiglottic inversion, decreased tongue base retraction, and reduced anterior hyoid excursion. This resulted in collection of vallecular residue of puree and solid consistency, which cleared with thin liquid wash. Patient with penetration during the swallow due to delayed initiation, however penetration cleared with the force of the swallow. No aspiration was observed. No difficulty with Barium tablet. Of note, patient with ?lingual tremor, which did not interfere with oral phase of swallow. Additionally, patient reported globus sensation after MBS completed despite no observation of residue on MBS after completion of evaluation. Patient also with cough x1 after MBS completed in absence of aspiration. PLAN/RECOMMENDATIONS :  --Regular/thin liquid  --Straws okay  --Patient to use safe swallow strategies: Small bites and sips, following solids with lots of liquid to wash down solids, and upright positioning during PO intake  --No further SLP treatment is indicated at this time     COMMUNICATION/EDUCATION:   The above findings and recommendations were discussed with: patient who verbalized understanding.     Thank you for this referral.  Rajani Marr SLP  Time Calculation: 50 mins

## 2018-03-13 NOTE — PROCEDURES
Carotid Doppler:     Date:  03/07/2018     Requesting Physician:  Freida Faith     Indication:  Stenosis     B-mode imaging reveals mixed plaque at the bifurcations bilaterally extending into the internal carotid artery segments. Doppler spectral analysis reveals significantly no elevated velocities. Vertebral artery flow antegrade bilaterally. Interpretation:    1. Plaque as noted. 2. Stenosis estimated at 16-49% bilateral ICA.
Skin normal color for race, warm, dry and intact. No evidence of rash.

## 2018-03-15 ENCOUNTER — OFFICE VISIT (OUTPATIENT)
Dept: NEUROLOGY | Age: 78
End: 2018-03-15

## 2018-03-15 DIAGNOSIS — R26.9 GAIT DISORDER: ICD-10-CM

## 2018-03-15 DIAGNOSIS — F32.A ANXIETY AND DEPRESSION: ICD-10-CM

## 2018-03-15 DIAGNOSIS — G30.1 LATE ONSET ALZHEIMER'S DISEASE WITHOUT BEHAVIORAL DISTURBANCE (HCC): Primary | ICD-10-CM

## 2018-03-15 DIAGNOSIS — R44.1 VISUAL HALLUCINATION: ICD-10-CM

## 2018-03-15 DIAGNOSIS — F41.9 ANXIETY AND DEPRESSION: ICD-10-CM

## 2018-03-15 DIAGNOSIS — F02.80 LATE ONSET ALZHEIMER'S DISEASE WITHOUT BEHAVIORAL DISTURBANCE (HCC): Primary | ICD-10-CM

## 2018-03-15 NOTE — PROGRESS NOTES
[address] [facility],[address]  [address]   [place] [address] [facility] [address] [facility]   Joann Wilson   [phone] Office   [phone] Fax      Neuropsychology    Exam #2    Initial Diagnostic Interview Note      Referral:  Patricia Beltre MD    William Mora is a 65 y.o. right handed   male who was accompanied by his spouse to the initial clinical interview on  2/15/18. Please refer to his medical records for details pertaining to his history. William Mora is a 67 y.o. right handed   male who was unaccompanied to the initial clinical interview on 4/4/16. Please refer to his medical records for details pertaining to his history. Briefly, the patient reported that he completed a Master's Degree in Adult Education. He is retired - 23 years in [employer] and then 19 years in [employer]. He had knee surgery in October and then went to rehab and fell once at home (prior to rehab). Was in rehab 24 hours. Since last year, he has had some problems with short term memory loss and increased word finding problems. Problems getting worse over the last year. In November he had facial droop and taken by EMS to [facility] - where he was for four days, and told TIA. Then went to rehab again and stayed x 22 days for the knee.  No problems with driving. He reports being independent for medications and finances and such. Sometimes spouse reminds, but rarely so. Problems with memory present prior to TIA in November. He shakes when he eats. Just finished outpatient PT/OT. There may be some depression related to all of this. No prior psych history. No prior known TBI, meningitis/encephalitis, FOREIGN Fever, Lupus, Lyme, etc.  He was terrified by the fall. No counseling or psychiatrist currently. Appetite is okay. Sleep is unchanged - bladder function is poor, s/p prostatectomy.  Staff at rehab facility helped him with his intense fears and panic.       My diagnostic impressions at that time included: This patient generated an abnormal range Neuropsychological Evaluation with respect to neurocognitive functioning. In this regard, he is showing problems with verbal fluency, sustained attention, auditory learning, auditory memory, and bilateral motor skills. At the same time, his mental status, confrontation naming, working memory, processing speed, perceptual reasoning, verbal comprehension, executive functioning remain normal.  Emotionally, there is support for mild anxiety,.                             In my opinion, this profile is consistent with an evolving organic process that is currently at a mild level of severity. AD is likely, and it has been caught quite early. Anxiety exacerbates, but this is not a pseudodementia. In addition to continued medical care, my recommendations include consideration for memory management medication as well as treatment for attention and anxiety problems. Counseling may prove helpful as well. He is competent, but needs a system in place for medications and finances. We will need to keep an eye on driving safety. This issue has been caught early on, and I hope that he recognizes this as positive. Baseline now established. Follow up prn. Clinical correlation is, of course, indicated.                             I will discuss these findings with the patient and family when they follow up with me in the near future. A follow up Neuropsychological Evaluation is indicated on a prn basis.       DIAGNOSES:           Dementia - Mild                                     Anxiety - Mild    Since I saw him last, the patient reported that his memory seems to be okay. Short term memory is still not good. Only a mild decline. Spouse says they bought a new car in August and stopped driving after doing it twice. Was having difficulties and was getting frustrated so he stopped.   She says his memory has declined. He has good and bad days. He is not motivated during the day. Does not want to go out and do anything. He forgets a lot. Repeats a lot. She is disconnected and unmotivated. He is sleeping with Risperdal 0.5 mg. Has had a MRI done recently. Sleeps much. On Namzaric. Other diagnostics done. Gets assistance for most ADLs now. He is currently in assisted living, because spouse couldn't care for him anymore. His mood has declined significantly. ? Depression now in addition to the anxiety we saw previously. Does a lot of sitting and staring. Steps and freezes, which doesn't happen very often. He has been hallucinating - saying he was fighting pirates for his neighnor, and also has seen cattle in his kitchen.       Neuropsychological Mental Status Exam (NMSE):  Historian: Good  Praxis: No UE apraxia  R/L Orientation: Intact to self and to other  Dress: within normal limits   Weight: Overweight  Appearance/Hygiene: within normal limits   Gait: within normal limits   Assistive Devices: WC, Hearing Aids, Glasses  Mood: within normal limits   Affect: within normal limits   Comprehension: within normal limits   Thought Process: within normal limits   Expressive Language: within normal limits   Receptive Language: within normal limits   Motor:  No cognitive or motor perseveration  ETOH: Denied  Tobacco: Denied  Illicit: Denied  SI/HI: Denied  Psychosis: See above  Insight: Within normal limits  Judgment: Within normal limits  Other Psych:      Past Medical History:   Diagnosis Date    Anxiety     Arthritis     Diabetes (HCC)     Falls     Fatigue     Hearing loss     History of knee replacement     Hypertension     Muscle weakness     Ringing in ears     Skipped beats     [facility]roke [facility])        Past Surgical History:   Procedure Laterality Date    COLONOSCOPY N/A 8/15/2017    COLONOSCOPY performed by Daniella Miller MD at [facility] HX KNEE REPLACEMENT  2016    R knee replacement    HX ORTHOPAEDIC      left knee replacement    HX PROSTATECTOMY         Allergies   Allergen Reactions    Atenolol Unknown (comments)    Dilaudid [Hydromorphone] Unknown (comments)    Lisinopril Unknown (comments)    Oxycodone Unknown (comments)       History reviewed. No pertinent family history. Social History   Substance Use Topics    Smoking status: Former Smoker    Smokeless tobacco: Former User     Quit date: 1968    Alcohol use 3.0 oz/week     5 Shots of liquor per week       Current Outpatient Prescriptions   Medication Sig Dispense Refill    celecoxib (CELEBREX) 200 mg capsule Take  by mouth two (2) times a day.  acetaminophen (TYLENOL) 650 mg TbER Take 650 mg by mouth every eight (8) hours.  risperiDONE (RISPERDAL) 0.5 mg tablet Take 1 Tab by mouth nightly. 90 Tab 1    escitalopram oxalate (LEXAPRO) 20 mg tablet Take 1.5 tablets by mouth daily 135 Tab 1    diazePAM (VALIUM) 5 mg tablet Take tablet 30-45 minutes before MRI. Must have  drive home after. 1 Tab 0    dicyclomine (BENTYL) 10 mg capsule Take 10 mg by mouth daily.  NAMZARIC 28-10 mg CSpX TAKE 1 CAPSULE DAILY 90 Cap 3    allopurinol (ZYLOPRIM) 300 mg tablet   0    glipiZIDE SR (GLUCOTROL) 5 mg CR tablet   0    hydrochlorothiazide (HYDRODIURIL) 25 mg tablet   0    metoprolol (LOPRESSOR) 25 mg tablet   0    simvastatin (ZOCOR) 20 mg tablet   0    fexofenadine (ALLEGRA) 180 mg tablet Take  by mouth.  imipramine (TOFRANIL) 25 mg tablet Take 25 mg by mouth three (3) times daily.  sitaGLIPtin-metFORMIN (JANUMET) 50-1,000 mg per tablet Take 1 Tab by mouth two (2) times daily (with meals).  rivaroxaban (XARELTO) 20 mg tab tablet Take  by mouth daily.  Cholecalciferol, Vitamin D3, 1,000 unit cap Take  by mouth daily. Plan:  Obtain authorization for testing from insurance company. Report to follow once testing, scoring, and interpretation completed.   ? Organic based neurocognitive issues versus mood disorder or combination of same. ? Problems organic, functional, or both? This note will not be viewable in [address] 65year old seen last two years ago with mild dementia and mild mood problems. Comes now for re-evaluation for changes in status especially mood/behaviora. Will evaluate and update from previous to compare/contrast and to make recommendations from there.

## 2018-03-16 NOTE — PROCEDURES
EEG:      Date:  03/07/2018     Requesting Physician:  David Walls     An EEG is requested in this 65 y.o. man with confusion to evaluate for epileptiform abnormality. Medications:  Medications are said to include Valium, Bentyl, Lexapro, Risperdal, Celebrex, lopressor, Zocor, Xeralto, Zyloprim, Tofranil, Allegra, Namzaric. This tracing is obtained during the awake state. During wakefulness there are brief intermittent runs of posteriorly-dominant and symmetrical low-to-medium amplitude 9 cycle per second activities which attenuate with eye opening. Lower-voltage faster-frequency activities are seen symmetrically over the anterior head regions. Hyperventilation is not performed secondary to his medical history. Intermittent photic induces symmetric posterior driving responses. Sleep is not attained. Interpretation: This EEG recorded during the awake state is normal.  No epileptiform abnormalities are seen.

## 2018-03-30 ENCOUNTER — OFFICE VISIT (OUTPATIENT)
Dept: NEUROLOGY | Age: 78
End: 2018-03-30

## 2018-03-30 ENCOUNTER — TELEPHONE (OUTPATIENT)
Dept: NEUROLOGY | Age: 78
End: 2018-03-30

## 2018-03-30 DIAGNOSIS — G30.1 LATE ONSET ALZHEIMER'S DISEASE WITHOUT BEHAVIORAL DISTURBANCE (HCC): Primary | ICD-10-CM

## 2018-03-30 DIAGNOSIS — F02.80 LATE ONSET ALZHEIMER'S DISEASE WITHOUT BEHAVIORAL DISTURBANCE (HCC): Primary | ICD-10-CM

## 2018-03-30 DIAGNOSIS — F33.1 DEPRESSION, MAJOR, RECURRENT, MODERATE (HCC): ICD-10-CM

## 2018-03-30 DIAGNOSIS — F41.1 GENERALIZED ANXIETY DISORDER: ICD-10-CM

## 2018-03-30 NOTE — PROGRESS NOTES
[address] [facility],[address]  [address]   [place] [address] [facility] [address] [facility]   [phone] Office   [phone] Fax      Neuropsychological Evaluation Report    Exam #2  Referral:  Rosemary Hernandez MD    Amarjit Moon is a 65 y.o. right handed   male who was accompanied by his spouse to the initial clinical interview on  2/15/18. Please refer to his medical records for details pertaining to his history. Amarjit Moon is a 67 y.o. right handed   male who was unaccompanied to the initial clinical interview on 4/4/16. Please refer to his medical records for details pertaining to his history. Briefly, the patient reported that he completed a Master's Degree in Adult Education. He is retired - 23 years in [employer] and then 19 years in [employer]. He had knee surgery in October and then went to rehab and fell once at home (prior to rehab). Was in rehab 24 hours. Since last year, he has had some problems with short term memory loss and increased word finding problems. Problems getting worse over the last year. In November he had facial droop and taken by EMS to [facility] - where he was for four days, and told TIA. Then went to rehab again and stayed x 22 days for the knee.  No problems with driving. He reports being independent for medications and finances and such. Sometimes spouse reminds, but rarely so. Problems with memory present prior to TIA in November. He shakes when he eats. Just finished outpatient PT/OT. There may be some depression related to all of this. No prior psych history. No prior known TBI, meningitis/encephalitis, FOREIGN Fever, Lupus, Lyme, etc.  He was terrified by the fall. No counseling or psychiatrist currently. Appetite is okay. Sleep is unchanged - bladder function is poor, s/p prostatectomy.  Staff at rehab facility helped him with his intense fears and panic.       My diagnostic impressions at that time included: This patient generated an abnormal range Neuropsychological Evaluation with respect to neurocognitive functioning. In this regard, he is showing problems with verbal fluency, sustained attention, auditory learning, auditory memory, and bilateral motor skills. At the same time, his mental status, confrontation naming, working memory, processing speed, perceptual reasoning, verbal comprehension, executive functioning remain normal.  Emotionally, there is support for mild anxiety,.                             In my opinion, this profile is consistent with an evolving organic process that is currently at a mild level of severity. AD is likely, and it has been caught quite early. Anxiety exacerbates, but this is not a pseudodementia. In addition to continued medical care, my recommendations include consideration for memory management medication as well as treatment for attention and anxiety problems. Counseling may prove helpful as well. He is competent, but needs a system in place for medications and finances. We will need to keep an eye on driving safety. This issue has been caught early on, and I hope that he recognizes this as positive. Baseline now established. Follow up prn. Clinical correlation is, of course, indicated.                             I will discuss these findings with the patient and family when they follow up with me in the near future. A follow up Neuropsychological Evaluation is indicated on a prn basis.       DIAGNOSES:           Dementia - Mild                                     Anxiety - Mild    Since I saw him last, the patient reported that his memory seems to be okay. Short term memory is still not good. Only a mild decline. Spouse says they bought a new car in August and stopped driving after doing it twice. Was having difficulties and was getting frustrated so he stopped.   She says his memory has declined. He has good and bad days. He is not motivated during the day. Does not want to go out and do anything. He forgets a lot. Repeats a lot. She is disconnected and unmotivated. He is sleeping with Risperdal 0.5 mg. Has had a MRI done recently. Sleeps much. On Namzaric. Other diagnostics done. Gets assistance for most ADLs now. He is currently in assisted living, because spouse couldn't care for him anymore. His mood has declined significantly. ? Depression now in addition to the anxiety we saw previously. Does a lot of sitting and staring. Steps and freezes, which doesn't happen very often. He has been hallucinating - saying he was fighting pirates for his neighnor, and also has seen cattle in his kitchen.       Neuropsychological Mental Status Exam (NMSE):  Historian: Good  Praxis: No UE apraxia  R/L Orientation: Intact to self and to other  Dress: within normal limits   Weight: Overweight  Appearance/Hygiene: within normal limits   Gait: within normal limits   Assistive Devices: WC, Hearing Aids, Glasses  Mood: within normal limits   Affect: within normal limits   Comprehension: within normal limits   Thought Process: within normal limits   Expressive Language: within normal limits   Receptive Language: within normal limits   Motor:  No cognitive or motor perseveration  ETOH: Denied  Tobacco: Denied  Illicit: Denied  SI/HI: Denied  Psychosis: See above  Insight: Within normal limits  Judgment: Within normal limits  Other Psych:      Past Medical History:   Diagnosis Date    Anxiety     Arthritis     Diabetes (HCC)     Falls     Fatigue     Hearing loss     History of knee replacement     Hypertension     Muscle weakness     Ringing in ears     Skipped beats     Stroke [facility])        Past Surgical History:   Procedure Laterality Date    COLONOSCOPY N/A 8/15/2017    COLONOSCOPY performed by Elder Erazo MD at [address] [facility] HX KNEE REPLACEMENT  2016    R knee replacement    HX ORTHOPAEDIC      left knee replacement    HX PROSTATECTOMY         Allergies   Allergen Reactions    Atenolol Unknown (comments)    Dilaudid [Hydromorphone] Unknown (comments)    Lisinopril Unknown (comments)    Oxycodone Unknown (comments)       History reviewed. No pertinent family history. Social History   Substance Use Topics    Smoking status: Former Smoker    Smokeless tobacco: Former User     Quit date: 1968    Alcohol use 3.0 oz/week     5 Shots of liquor per week       Current Outpatient Prescriptions   Medication Sig Dispense Refill    celecoxib (CELEBREX) 200 mg capsule Take  by mouth two (2) times a day.  acetaminophen (TYLENOL) 650 mg TbER Take 650 mg by mouth every eight (8) hours.  risperiDONE (RISPERDAL) 0.5 mg tablet Take 1 Tab by mouth nightly. 90 Tab 1    escitalopram oxalate (LEXAPRO) 20 mg tablet Take 1.5 tablets by mouth daily 135 Tab 1    diazePAM (VALIUM) 5 mg tablet Take tablet 30-45 minutes before MRI. Must have  drive home after. 1 Tab 0    dicyclomine (BENTYL) 10 mg capsule Take 10 mg by mouth daily.  NAMZARIC 28-10 mg CSpX TAKE 1 CAPSULE DAILY 90 Cap 3    allopurinol (ZYLOPRIM) 300 mg tablet   0    glipiZIDE SR (GLUCOTROL) 5 mg CR tablet   0    hydrochlorothiazide (HYDRODIURIL) 25 mg tablet   0    metoprolol (LOPRESSOR) 25 mg tablet   0    simvastatin (ZOCOR) 20 mg tablet   0    fexofenadine (ALLEGRA) 180 mg tablet Take  by mouth.  imipramine (TOFRANIL) 25 mg tablet Take 25 mg by mouth three (3) times daily.  sitaGLIPtin-metFORMIN (JANUMET) 50-1,000 mg per tablet Take 1 Tab by mouth two (2) times daily (with meals).  rivaroxaban (XARELTO) 20 mg tab tablet Take  by mouth daily.  Cholecalciferol, Vitamin D3, 1,000 unit cap Take  by mouth daily. Plan:  Obtain authorization for testing from insurance company. Report to follow once testing, scoring, and interpretation completed.   ? Organic based neurocognitive issues versus mood disorder or combination of same. ? Problems organic, functional, or both? This note will not be viewable in [address] 65year old seen last two years ago with mild dementia and mild mood problems. Comes now for re-evaluation for changes in status especially mood/behaviora. Will evaluate and update from previous to compare/contrast and to make recommendations from there. Neuropsychological Test Results  Patient Testing 3/30/18 Report Completed 3/30/18  A Psychometrist Assisted w/ portions of this evaluation while under my direct  supervision    The following evaluation procedures/tests were administered:      Neuropsychologist Administered/Interpreted:  Neuropsychological Mental Status Exam, Revised Memory & Behavior Checklist,  Mini Mental Status Exam, Clock Drawing Test, Test Of Premorbid Functioning, Colby-Melzack Pain Questionnaire, History Taking  & Clinical Interview With The Patient, Additional History Taking w/ The Patient's Spouse,  Review Of Available Records. Psychometrist Administered under Neuropsychologist Supervision & Neuropsychologist Interpreted:  Verbal Fluency Tests, Kevin & Kevin - Revised, Trailmaking Test Parts A & B, Wechsler Adult Intelligence Scale - IV, Prosper Continuous Performance Test - III, [employer] - 3, Grooved Pegboard, Prince Depression Inventory - II, Prince Anxiety Inventory, Personality Assessment Inventory. Test Findings:  Test Findings:  Note:  The patients raw data have been compared with currently available norms which include demographic corrections for age, gender, and/or education. Sometimes, the patients scores are compared to demographically similar individuals as close to the patients age, education level, etc., as possible. \"Average\" is viewed as being +/- 1 standard deviation (SD) from the stated mean for a particular test score.   \"Low average\" is viewed as being between 1 and 2 SD below the mean, and above average is viewed as being 1 and 2 SD above the mean. Scores falling in the borderline range (between 1-1/2 and 2 SD below the mean) are viewed with particular attention as to whether they are normal or abnormal neurocognitive test scores. Other methods of inference in analyzing the test data are also utilized, including the pattern and range of scores in the profile, bilateral motor functions, and the presence, if any, of pathognomonic signs. Behaviorally, the patient was friendly and cooperative and appeared motivated to perform well during this examination. Within this context, the results of this evaluation are viewed as a valid reflection of the patients actual neurocognitive and emotional status. His MMSE score of 24/30 correct was impaired. In this regard, he was not oriented to date. Serial 7s were 3/5 correct. Recall for three words after a brief delay was 2/3 correct. Visual construction was impaired. Clock drawing was impaired. His structured word list fluency, as assessed by the FAS Test, was within the mildly to moderately impaired range with a T score of 34. Category fluency was within the moderately impaired range with a T score of 26,  Confrontation naming ability, as assessed by the [facility] - Revised, was within the above average range at 58/60 correct (T = 63). This pattern of performance is indicative of a patient who is at increased risk for day-to-day problems with verbal fluency and confrontation naming was normal.       The patient was administered the Prosper Continuous Performance Test - III,a computer administered test of sustained attention, and review of the subscales within this instrument revealed numerous concerns for inattentiveness with impulsivity. This pattern of performance is indicative of a patient who is at increased risk for day-to-day problems with sustained visual attention/concentration.        The patient is not showing problems with working memory capacity (23rd %ile) and processing speed (10th %ile) on the WAIS-IV. His Verbal Comprehension Index score of 110 (75th  %ile). His Perceptual Reasoning Index score of 81 (10th %ile). These scores do not reflect a major decline in functioning based on an assessment of premorbid functioning. The patient was administered the [place] Verbal Learning Test  - 3 and generated an impaired range (and flat) learning curve over five repeated auditory word list learning trials. An interference trial was within the normal range. Free and cued, short and long delayed recall were all impaired. Recognition recall was impaired, as was his forced choice recall. No concern for malingering, however. This pattern of performance is indicative of a patient who is at increased risk for day-to-day problems with auditory learning and memory. Simple timed visual motor sequencing (Trailmaking Test Part A) was within the severely impaired range with a T score of 17. His performance on a similar, but more complex task of timed visual motor sequencing (Trailmaking Test Part B) was within the severely impaired range with a T score of 17. This latter test was discontinued. Taken together, this pattern of performance is indicative of a patient who is at increased risk for day-to-day problems with executive functioning. Fine motor dexterity was within the moderately to severely impaired range bilaterally.  strength was moderately to severely impaired bilaterally. The patient rated his current level of pain as \"0/5- No Pain\" on the Colby-Melzack Pain Questionnaire. His Prince Depression Inventory- II score of 20 was within the moderately depressed range. His Prince Anxiety Inventory score of 18 reflected moderate anxiety. The patient was administered the Personality Assessment Inventory and generated a valid profile for interpretation.   Within this context, marked anxiety and depression issues are present. The personality profile is otherwise normal.      Impressions & Recommendations: This patient again generated an abnormal range Neuropsychological Evaluation with respect to neurocognitive functioning. In this regard, he is showing problems with mental status, verbal fluency, sustained attention, auditory learning, auditory memory, bilateral motor skills, and executive functioning. Casual language skills will serve to mask underlying cognitive deficits at times. Emotionally, the patient reported moderate depression and moderate anxiety. In my opinion, this profile is consistent with an evolving organic process that has declined to the mild to moderate range. Mood issues have worsened as well (previously mild and now moderate), but that would not be the sole basis for the decline we are seeing here. It is an exacerbating factor here. I suggest a review of his current medication management for memory. Consider treatment for attention as well as psychiatric treatment for moderate depression and anxiety. The patient should be encouraged to remain as mentally, socially, and physically active as possible. The patient's generated cognitive difficulties are significant to the degree whereby it is my opinion that he should not live independently without appropriate supervision for those domains with a heavy memory emphasis. This includes medication management supervision and supervision of financial dealings. Marked problems with attention and reaction time raise concern regarding driving safety, and I suggest consideration for a formal evaluation of same. I do not believe that he is competent to manage his own affairs at this time. We now have baseline and updated data on him. Follow up prn. Clinical correlation is, of course, indicated. I will discuss these findings with the patient and family when they follow up with me in the near future.   A follow up Neuropsychological Evaluation is indicated on a prn basis. DIAGNOSES: Dementia - Mild To Moderate (progressing)     Depression - Moderate (progressing over time)    Anxiety - Moderate, (Worse over time)     The above information is based upon information currently available to me. If there is any additional information of which I am currently unaware, I would be more than happy to review it upon having it made available to me. Thank you for the opportunity to see this interesting individual.     Sincerely,       Eduard Felty. Vicente Langley PsyD, EdS      Attachments:  IQ Test Results (In Media Section Of This EMR)    Cc: Katie Marie MD    2 units -73578- 1.75 hours Record review. Review of history provided by patient. Review of collaborative information. Testing by Clinician. Review of raw data. Scoring. Report writing of individual tests administered by Clinician. Integration of individual tests administered by psychometrist (that were previously reported and billed under psychometry code below) with testing by clinician and review of records/history/collaborative information. Case Conceptualization, Report writing. Coordination Of Care. 4 units  -14702 - 3.75 hours Psychometrist test prep, administration, and scoring under clinician's direct supervision. Clinical interpretation of individual tests administered by psychometrist .  Clinician report of individual tests administered by psychometrist.    \"Unit\" is defined by CPT/National Guidelines (31 - 60 minutes). Integral services including scoring of raw data, data interpretation, case conceptualization, report writing etcetera were initiated after the patient finished testing/raw data collected and was completed on the date the report was signed.

## 2018-03-30 NOTE — TELEPHONE ENCOUNTER
Patient's spouse Juan Blevins would like a call from the nurse or NP to go over test results (MRI, EEG). Offered to schedule appointment with NP but spouse is going out of country on 04/05/18 and was hoping for a call before then.  Best Contact [phone]

## 2018-04-05 NOTE — TELEPHONE ENCOUNTER
Called and spoke to patients wife. Informed her of results. She states understanding and will call back with any further questions    Per NP:    EEG normal per Dr. Jarad Antunez read. MRI brain looked normal with no stroke, tumor, lesions. Just some progression of artophy which is normal aging. Nothing on these explaining the worsening  Memory loss due to a structural point of view.   (Routing comment)

## 2018-04-20 ENCOUNTER — APPOINTMENT (OUTPATIENT)
Dept: GENERAL RADIOLOGY | Age: 78
End: 2018-04-20
Attending: EMERGENCY MEDICINE
Payer: MEDICARE

## 2018-04-20 ENCOUNTER — HOSPITAL ENCOUNTER (EMERGENCY)
Age: 78
Discharge: OTHER INSTITUTION | End: 2018-04-20
Attending: EMERGENCY MEDICINE | Admitting: EMERGENCY MEDICINE
Payer: MEDICARE

## 2018-04-20 ENCOUNTER — APPOINTMENT (OUTPATIENT)
Dept: CT IMAGING | Age: 78
End: 2018-04-20
Attending: EMERGENCY MEDICINE
Payer: MEDICARE

## 2018-04-20 VITALS
RESPIRATION RATE: 23 BRPM | TEMPERATURE: 97.4 F | BODY MASS INDEX: 35.08 KG/M2 | OXYGEN SATURATION: 93 % | SYSTOLIC BLOOD PRESSURE: 180 MMHG | DIASTOLIC BLOOD PRESSURE: 78 MMHG | HEIGHT: 72 IN | WEIGHT: 259 LBS | HEART RATE: 73 BPM

## 2018-04-20 DIAGNOSIS — N30.00 ACUTE CYSTITIS WITHOUT HEMATURIA: Primary | ICD-10-CM

## 2018-04-20 DIAGNOSIS — R42 DIZZINESS: ICD-10-CM

## 2018-04-20 LAB
ALBUMIN SERPL-MCNC: 3.2 G/DL (ref 3.5–5)
ALBUMIN/GLOB SERPL: 0.8 {RATIO} (ref 1.1–2.2)
ALP SERPL-CCNC: 72 U/L (ref 45–117)
ALT SERPL-CCNC: 21 U/L (ref 12–78)
ANION GAP SERPL CALC-SCNC: 8 MMOL/L (ref 5–15)
APPEARANCE UR: CLEAR
AST SERPL-CCNC: 16 U/L (ref 15–37)
BACTERIA URNS QL MICRO: NEGATIVE /HPF
BASOPHILS # BLD: 0.1 K/UL (ref 0–0.1)
BASOPHILS NFR BLD: 1 % (ref 0–1)
BILIRUB SERPL-MCNC: 0.2 MG/DL (ref 0.2–1)
BILIRUB UR QL CFM: NEGATIVE
BUN SERPL-MCNC: 28 MG/DL (ref 6–20)
BUN/CREAT SERPL: 21 (ref 12–20)
CALCIUM SERPL-MCNC: 8.8 MG/DL (ref 8.5–10.1)
CHLORIDE SERPL-SCNC: 103 MMOL/L (ref 97–108)
CO2 SERPL-SCNC: 30 MMOL/L (ref 21–32)
COLOR UR: YELLOW
CREAT SERPL-MCNC: 1.31 MG/DL (ref 0.7–1.3)
DIFFERENTIAL METHOD BLD: ABNORMAL
EOSINOPHIL # BLD: 0.2 K/UL (ref 0–0.4)
EOSINOPHIL NFR BLD: 1 % (ref 0–7)
EPITH CASTS URNS QL MICRO: ABNORMAL /LPF
ERYTHROCYTE [DISTWIDTH] IN BLOOD BY AUTOMATED COUNT: 13.2 % (ref 11.5–14.5)
GLOBULIN SER CALC-MCNC: 4.1 G/DL (ref 2–4)
GLUCOSE SERPL-MCNC: 146 MG/DL (ref 65–100)
GLUCOSE UR STRIP.AUTO-MCNC: >1000 MG/DL
HCT VFR BLD AUTO: 39.3 % (ref 36.6–50.3)
HGB BLD-MCNC: 12.2 G/DL (ref 12.1–17)
HGB UR QL STRIP: ABNORMAL
IMM GRANULOCYTES # BLD: 0.1 K/UL (ref 0–0.04)
IMM GRANULOCYTES NFR BLD AUTO: 1 % (ref 0–0.5)
KETONES UR QL STRIP.AUTO: >80 MG/DL
LEUKOCYTE ESTERASE UR QL STRIP.AUTO: ABNORMAL
LYMPHOCYTES # BLD: 1.4 K/UL (ref 0.8–3.5)
LYMPHOCYTES NFR BLD: 12 % (ref 12–49)
MAGNESIUM SERPL-MCNC: 1.6 MG/DL (ref 1.6–2.4)
MCH RBC QN AUTO: 30.3 PG (ref 26–34)
MCHC RBC AUTO-ENTMCNC: 31 G/DL (ref 30–36.5)
MCV RBC AUTO: 97.5 FL (ref 80–99)
MONOCYTES # BLD: 0.8 K/UL (ref 0–1)
MONOCYTES NFR BLD: 7 % (ref 5–13)
NEUTS SEG # BLD: 9.4 K/UL (ref 1.8–8)
NEUTS SEG NFR BLD: 79 % (ref 32–75)
NITRITE UR QL STRIP.AUTO: POSITIVE
NRBC # BLD: 0 K/UL (ref 0–0.01)
NRBC BLD-RTO: 0 PER 100 WBC
PH UR STRIP: 6.5 [PH] (ref 5–8)
PLATELET # BLD AUTO: 225 K/UL (ref 150–400)
PMV BLD AUTO: 10.4 FL (ref 8.9–12.9)
POTASSIUM SERPL-SCNC: 4.5 MMOL/L (ref 3.5–5.1)
PROT SERPL-MCNC: 7.3 G/DL (ref 6.4–8.2)
PROT UR STRIP-MCNC: >300 MG/DL
RBC # BLD AUTO: 4.03 M/UL (ref 4.1–5.7)
RBC #/AREA URNS HPF: ABNORMAL /HPF (ref 0–5)
SODIUM SERPL-SCNC: 141 MMOL/L (ref 136–145)
SP GR UR REFRACTOMETRY: 1.02 (ref 1–1.03)
TROPONIN I SERPL-MCNC: <0.04 NG/ML
UR CULT HOLD, URHOLD: NORMAL
UROBILINOGEN UR QL STRIP.AUTO: 1 EU/DL (ref 0.2–1)
WBC # BLD AUTO: 11.9 K/UL (ref 4.1–11.1)
WBC URNS QL MICRO: ABNORMAL /HPF (ref 0–4)

## 2018-04-20 PROCEDURE — 85025 COMPLETE CBC W/AUTO DIFF WBC: CPT | Performed by: EMERGENCY MEDICINE

## 2018-04-20 PROCEDURE — 81001 URINALYSIS AUTO W/SCOPE: CPT | Performed by: EMERGENCY MEDICINE

## 2018-04-20 PROCEDURE — 36415 COLL VENOUS BLD VENIPUNCTURE: CPT | Performed by: EMERGENCY MEDICINE

## 2018-04-20 PROCEDURE — 71045 X-RAY EXAM CHEST 1 VIEW: CPT

## 2018-04-20 PROCEDURE — 80053 COMPREHEN METABOLIC PANEL: CPT | Performed by: EMERGENCY MEDICINE

## 2018-04-20 PROCEDURE — 77030011943

## 2018-04-20 PROCEDURE — 83735 ASSAY OF MAGNESIUM: CPT | Performed by: EMERGENCY MEDICINE

## 2018-04-20 PROCEDURE — 87086 URINE CULTURE/COLONY COUNT: CPT | Performed by: EMERGENCY MEDICINE

## 2018-04-20 PROCEDURE — 84484 ASSAY OF TROPONIN QUANT: CPT | Performed by: EMERGENCY MEDICINE

## 2018-04-20 PROCEDURE — 70450 CT HEAD/BRAIN W/O DYE: CPT

## 2018-04-20 PROCEDURE — 99285 EMERGENCY DEPT VISIT HI MDM: CPT

## 2018-04-20 PROCEDURE — 74011250637 HC RX REV CODE- 250/637: Performed by: EMERGENCY MEDICINE

## 2018-04-20 RX ORDER — METOPROLOL TARTRATE 25 MG/1
25 TABLET, FILM COATED ORAL
Status: DISCONTINUED | OUTPATIENT
Start: 2018-04-20 | End: 2018-04-20

## 2018-04-20 RX ORDER — CEPHALEXIN 500 MG/1
500 CAPSULE ORAL 2 TIMES DAILY
Qty: 10 CAP | Refills: 0 | Status: SHIPPED | OUTPATIENT
Start: 2018-04-20 | End: 2018-04-30

## 2018-04-20 RX ORDER — CEPHALEXIN 250 MG/1
500 CAPSULE ORAL
Status: COMPLETED | OUTPATIENT
Start: 2018-04-20 | End: 2018-04-20

## 2018-04-20 RX ORDER — HYDROCHLOROTHIAZIDE 25 MG/1
25 TABLET ORAL
Status: COMPLETED | OUTPATIENT
Start: 2018-04-20 | End: 2018-04-20

## 2018-04-20 RX ADMIN — HYDROCHLOROTHIAZIDE 25 MG: 25 TABLET ORAL at 14:54

## 2018-04-20 RX ADMIN — CEPHALEXIN 500 MG: 250 CAPSULE ORAL at 13:27

## 2018-04-20 NOTE — ED NOTES
Patient states he is unable to urinate at this time but patient does feel the need.  Patient given urinal and call bell and told to call when he feels the urge

## 2018-04-20 NOTE — ED PROVIDER NOTES
HPI Comments: Mr. Ayla David is a 74-year-old male with past medical history of TIA, arthritis, diabetes, hypertension, anxiety, falls, tinnitus, presenting via EMS for complaints of dizziness and ataxia. Patient states that dizziness ataxia has been ongoing for approximately 10 days, he was evaluated by his primary care physician 2 days ago who discontinue glipizide as a possible contributor. He states it symptoms were worse this morning, prompting him to be sent to the emergency department. He denies headache, visual changes, focal neurologic complaints, nausea, vomiting, chest pain or shortness of breath. Patient is a 65 y.o. male presenting with dizziness. The history is provided by the patient. No  was used. Dizziness   This is a new problem. The current episode started more than 2 days ago. The problem has been gradually worsening. There was no focality noted. Primary symptoms include loss of balance. Pertinent negatives include no focal weakness, no loss of sensation, no slurred speech, no speech difficulty, no memory loss, no movement disorder, no agitation, no visual change, no auditory change, no mental status change, no unresponsiveness and no disorientation. There has been no fever. Pertinent negatives include no shortness of breath, no chest pain, no vomiting, no altered mental status, no confusion, no headaches, no choking, no nausea, no bowel incontinence and no bladder incontinence. There were no medications administered prior to arrival. Associated medical issues include CVA. Associated medical issues do not include trauma, mood changes, bleeding disorder, seizures, dementia or clotting disorder.         Past Medical History:   Diagnosis Date    Anxiety     Arthritis     Diabetes ([facility] [address]     Falls     Fatigue     Hearing loss     History of knee replacement     Hypertension     Muscle weakness     Ringing in ears     Skipped beats     Stroke [facility])        Past Surgical History:   Procedure Laterality Date    COLONOSCOPY N/A 8/15/2017    COLONOSCOPY performed by Alireza Moon MD at [address] [facility] HX KNEE REPLACEMENT  2016    R knee replacement    HX ORTHOPAEDIC      left knee replacement    HX PROSTATECTOMY           No family history on file. Social History     Social History    Marital status:      Spouse name: N/A    Number of children: N/A    Years of education: N/A     Occupational History    Not on file. Social History Main Topics    Smoking status: Former Smoker    Smokeless tobacco: Former User     Quit date: 1968    Alcohol use 3.0 oz/week     5 Shots of liquor per week    Drug use: No    Sexual activity: Not on file     Other Topics Concern    Not on file     Social History Narrative         ALLERGIES: Atenolol; Dilaudid [hydromorphone]; Lisinopril; and Oxycodone    Review of Systems   Constitutional: Negative for activity change, chills and fever. HENT: Negative for nosebleeds, sore throat, trouble swallowing and voice change. Eyes: Negative for visual disturbance. Respiratory: Negative for choking and shortness of breath. Cardiovascular: Negative for chest pain and palpitations. Gastrointestinal: Negative for abdominal pain, bowel incontinence, constipation, diarrhea, nausea and vomiting. Genitourinary: Negative for bladder incontinence, difficulty urinating, dysuria, hematuria and urgency. Musculoskeletal: Negative for back pain, neck pain and neck stiffness. Skin: Negative for color change. Allergic/Immunologic: Negative for immunocompromised state. Neurological: Positive for dizziness and loss of balance. Negative for focal weakness, seizures, syncope, facial asymmetry, speech difficulty, weakness, light-headedness, numbness and headaches. Psychiatric/Behavioral: Negative for agitation, behavioral problems, confusion, hallucinations, memory loss, self-injury and suicidal ideas.        There were no vitals filed for this visit. Physical Exam   Constitutional: He is oriented to person, place, and time. He appears well-developed and well-nourished. No distress. HENT:   Head: Normocephalic and atraumatic. Eyes: Pupils are equal, round, and reactive to light. Neck: Normal range of motion. Neck supple. Cardiovascular: Normal rate, regular rhythm and normal heart sounds. Exam reveals no gallop and no friction rub. No murmur heard. Pulmonary/Chest: Effort normal and breath sounds normal. No respiratory distress. He has no wheezes. Abdominal: Soft. Bowel sounds are normal. He exhibits no distension. There is no tenderness. There is no rebound and no guarding. Musculoskeletal: Normal range of motion. Neurological: He is alert and oriented to person, place, and time. He has normal strength. He displays tremor. No cranial nerve deficit or sensory deficit. GCS eye subscore is 4. GCS verbal subscore is 5. GCS motor subscore is 6. Skin: Skin is warm. No rash noted. He is not diaphoretic. Psychiatric: He has a normal mood and affect. His behavior is normal. Judgment and thought content normal.   Nursing note and vitals reviewed. MDM      ED Course     This is a 74-year-old male with past medical history, review of systems, physical exam as above, presenting with complaints of ataxia and dizziness, in the setting of history of diabetes, hypertension, and previous TIA. Physical exam remarkable for elderly male in no acute distress, with nonfocal neurologic exam.  Patient states is dizziness is made worse by standing, denies changes with head positioning. He is noted to be hypertensive, stating he has not had his blood pressure medicines this morning. Unlikely to be orthostatic given his hypertension, however may be related to elevated blood pressure. Likely to be TIA versus CVA, given nonfocal neurologic signs, and duration of symptoms. Plan to obtain a ACS workup, CMP, CBC, UA, head CT.  We will make a disposition based on the patient's diagnostics and response to therapy. Procedures    1:58 PM  Patient with mildly elevated glucose, UTI on UA, renal function unchanged, unchanged head CT. Likely dizzy 2/2 UTI. Will provide abx, send urine cx, and DC home with abx and PCP follow up.

## 2018-04-20 NOTE — DISCHARGE INSTRUCTIONS
Dizziness: Care Instructions  Your Care Instructions  Dizziness is the feeling of unsteadiness or fuzziness in your head. It is different than having vertigo, which is a feeling that the room is spinning or that you are moving or falling. It is also different from lightheadedness, which is the feeling that you are about to faint. It can be hard to know what causes dizziness. Some people feel dizzy when they have migraine headaches. Sometimes bouts of flu can make you feel dizzy. Some medical conditions, such as heart problems or high blood pressure, can make you feel dizzy. Many medicines can cause dizziness, including medicines for high blood pressure, pain, or anxiety. If a medicine causes your symptoms, your doctor may recommend that you stop or change the medicine. If it is a problem with your heart, you may need medicine to help your heart work better. If there is no clear reason for your symptoms, your doctor may suggest watching and waiting for a while to see if the dizziness goes away on its own. Follow-up care is a key part of your treatment and safety. Be sure to make and go to all appointments, and call your doctor if you are having problems. It's also a good idea to know your test results and keep a list of the medicines you take. How can you care for yourself at home? · If your doctor recommends or prescribes medicine, take it exactly as directed. Call your doctor if you think you are having a problem with your medicine. · Do not drive while you feel dizzy. · Try to prevent falls. Steps you can take include:  ¨ Using nonskid mats, adding grab bars near the tub, and using night-lights. ¨ Clearing your home so that walkways are free of anything you might trip on. ¨ Letting family and friends know that you have been feeling dizzy. This will help them know how to help you. When should you call for help? Call 911 anytime you think you may need emergency care.  For example, call if:  ? · You passed out (lost consciousness). ? · You have dizziness along with symptoms of a heart attack. These may include:  ¨ Chest pain or pressure, or a strange feeling in the chest.  ¨ Sweating. ¨ Shortness of breath. ¨ Nausea or vomiting. ¨ Pain, pressure, or a strange feeling in the back, neck, jaw, or upper belly or in one or both shoulders or arms. ¨ Lightheadedness or sudden weakness. ¨ A fast or irregular heartbeat. ? · You have symptoms of a stroke. These may include:  ¨ Sudden numbness, tingling, weakness, or loss of movement in your face, arm, or leg, especially on only one side of your body. ¨ Sudden vision changes. ¨ Sudden trouble speaking. ¨ Sudden confusion or trouble understanding simple statements. ¨ Sudden problems with walking or balance. ¨ A sudden, severe headache that is different from past headaches. ?Call your doctor now or seek immediate medical care if:  ? · You feel dizzy and have a fever, headache, or ringing in your ears. ? · You have new or increased nausea and vomiting. ? · Your dizziness does not go away or comes back. ? Watch closely for changes in your health, and be sure to contact your doctor if:  ? · You do not get better as expected. Where can you learn more? Go to http://saira-gogo.info/. Enter P656 in the search box to learn more about \"Dizziness: Care Instructions. \"  Current as of: March 20, 2017  Content Version: 11.4  © 6752-5172 [email or web address]. Care instructions adapted under license by [employer] (which disclaims liability or warranty for this information). If you have questions about a medical condition or this instruction, always ask your healthcare professional. [place] [address] any warranty or liability for your use of this information.          Urinary Tract Infections in Men: Care Instructions  Your Care Instructions    A urinary tract infection, or UTI, is a general term for an infection anywhere between the kidneys and the tip of the penis. UTIs can also be a result of a prostate problem. Most cause pain or burning when you urinate. Most UTIs are caused by bacteria and can be cured with antibiotics. It is important to complete your treatment so that the infection does not get worse. Follow-up care is a key part of your treatment and safety. Be sure to make and go to all appointments, and call your doctor if you are having problems. It's also a good idea to know your test results and keep a list of the medicines you take. How can you care for yourself at home? · Take your antibiotics as prescribed. Do not stop taking them just because you feel better. You need to take the full course of antibiotics. · Take your medicines exactly as prescribed. Your doctor may have prescribed a medicine, such as phenazopyridine (Pyridium), to help relieve pain when you urinate. This turns your urine orange. You may stop taking it when your symptoms get better. But be sure to take all of your antibiotics, which treat the infection. · Drink extra water for the next day or two. This will help make the urine less concentrated and help wash out the bacteria causing the infection. (If you have kidney, heart, or liver disease and have to limit your fluids, talk with your doctor before you increase your fluid intake.)  · Avoid drinks that are carbonated or have caffeine. They can irritate the bladder. · Urinate often. Try to empty your bladder each time. · To relieve pain, take a hot bath or lay a heating pad (set on low) over your lower belly or genital area. Never go to sleep with a heating pad in place. To help prevent UTIs  · Drink plenty of fluids, enough so that your urine is light yellow or clear like water. If you have kidney, heart, or liver disease and have to limit fluids, talk with your doctor before you increase the amount of fluids you drink. · Urinate when you have the urge.  Do not hold your urine for a long time. Urinate before you go to sleep. · Keep your penis clean. Catheter care  If you have a drainage tube (catheter) in place, the following steps will help you care for it. · Always wash your hands before and after touching your catheter. · Check the area around the urethra for inflammation or signs of infection. Signs of infection include irritated, swollen, red, or tender skin, or pus around the catheter. · Clean the area around the catheter with soap and water two times a day. Dry with a clean towel afterward. · Do not apply powder or lotion to the skin around the catheter. To empty the urine collection bag  · Wash your hands with soap and water. · Without touching the drain spout, remove the spout from its sleeve at the bottom of the collection bag. Open the valve on the spout. · Let the urine flow out of the bag and into the toilet or a container. Do not let the tubing or drain spout touch anything. · After you empty the bag, clean the end of the drain spout with tissue and water. Close the valve and put the drain spout back into its sleeve at the bottom of the collection bag. · Wash your hands with soap and water. When should you call for help? Call your doctor now or seek immediate medical care if:  ? · Symptoms such as a fever, chills, nausea, or vomiting get worse or happen for the first time. ? · You have new pain in your back just below your rib cage. This is called flank pain. ? · There is new blood or pus in your urine. ? · You are not able to take or keep down your antibiotics. ? Watch closely for changes in your health, and be sure to contact your doctor if:  ? · You are not getting better after taking an antibiotic for 2 days. ? · Your symptoms go away but then come back. Where can you learn more? Go to http://saira-gogo.info/. Enter J984 in the search box to learn more about \"Urinary Tract Infections in Men: Care Instructions. \"  Current as of: May 12, 2017  Content Version: 11.4  © 8724-7116 [employer]. Care instructions adapted under license by [employer] (which disclaims liability or warranty for this information). If you have questions about a medical condition or this instruction, always ask your healthcare professional. [address] any warranty or liability for your use of this information.

## 2018-04-20 NOTE — ED TRIAGE NOTES
Patient co dizziness x 10 days. States the dizziness is accompanied by low blood sugar and once he eats some chocolate or something he feels better. Patient reports dizziness upon standing that subsides when he is seated. Patient states he has been in an assisted living facility x 3 months bc wife is no longer able to meet his needs. Patient has hx of dementia, high blood pressure, hypercholesterolemia. Patient states he has not taken his medication today.

## 2018-04-22 LAB
BACTERIA SPEC CULT: NORMAL
CC UR VC: NORMAL
SERVICE CMNT-IMP: NORMAL

## 2018-04-30 ENCOUNTER — OFFICE VISIT (OUTPATIENT)
Dept: NEUROLOGY | Age: 78
End: 2018-04-30

## 2018-04-30 VITALS
WEIGHT: 259 LBS | HEIGHT: 72 IN | SYSTOLIC BLOOD PRESSURE: 130 MMHG | BODY MASS INDEX: 35.08 KG/M2 | DIASTOLIC BLOOD PRESSURE: 90 MMHG

## 2018-04-30 DIAGNOSIS — G30.1 LATE ONSET ALZHEIMER'S DISEASE WITH BEHAVIORAL DISTURBANCE (HCC): Primary | ICD-10-CM

## 2018-04-30 DIAGNOSIS — F02.818 LATE ONSET ALZHEIMER'S DISEASE WITH BEHAVIORAL DISTURBANCE (HCC): Primary | ICD-10-CM

## 2018-04-30 DIAGNOSIS — R26.9 GAIT ABNORMALITY: ICD-10-CM

## 2018-04-30 RX ORDER — CARBIDOPA AND LEVODOPA 25; 100 MG/1; MG/1
TABLET ORAL
Qty: 90 TAB | Refills: 3 | Status: SHIPPED | OUTPATIENT
Start: 2018-04-30 | End: 2018-05-11 | Stop reason: SDUPTHER

## 2018-04-30 NOTE — MR AVS SNAPSHOT
[facility] 
 
 
 [place] [address] [place] [address] [phone] Patient: Елена Rojas MRN: [id number] :1940 Visit Information Date & Time Provider Department Dept. Phone Encounter #  
 2018  3:00 PM Juan Garcia MD [facility] Neurology [facility] [phone] [id number] Follow-up Instructions Return for May 11. Your Appointments 2018 10:00 AM  
Follow Up with Hunter Sánchez PsyD  [facility] ([address]) Appt Note: office feedback/ ajn; office feedback [facility] [address] [place] [address] [place] [address] [phone]  
  
   
 [place] [phone] [id number] [address] 2018  1:40 PM  
[facility]: memory loss diana  
 [address] [place] [address] [phone]  
  
   
 [place] [address] [id number] [address] Upcoming Health Maintenance Date Due DTaP/Tdap/Td series (1 - Tdap) 10/9/1960 ZOSTER VACCINE AGE 60> 1999 GLAUCOMA SCREENING Q2Y 10/9/2004 Pneumococcal 65+ Low/Medium Risk (1 of 2 - PCV13) 10/9/2004 MEDICARE YEARLY EXAM 3/14/2018 Influenza Age 5 to Adult 2018 Allergies as of 2018  Review Complete On: 2018 By: April Daquan Severity Noted Reaction Type Reactions Atenolol  2016    Unknown (comments) Dilaudid [Hydromorphone]  2016    Unknown (comments) Lisinopril  2016    Unknown (comments) Oxycodone  2016    Unknown (comments) Current Immunizations  Never Reviewed No immunizations on file. Not reviewed this visit Vitals BP Height(growth percentile) Weight(growth percentile) BMI Smoking Status 130/90 6' (1.829 m) 259 lb (117.5 kg) 35.13 kg/m2 Former Smoker BMI and BSA Data Body Mass Index Body Surface Area  
 35.13 kg/m 2 2.44 m 2 Preferred Pharmacy Pharmacy Name Phone [facility], Tabitha Yao [phone] Your Updated Medication List  
  
   
This list is accurate as of 18  3:29 PM.  Always use your most recent med list.  
  
  
  
  
 allopurinol 300 mg tablet Commonly known as:  ZYLOPRIM  
  
 carbidopa-levodopa  mg per tablet Commonly known as:  SINEMET 1 po at 8am, 12 noon and 4pm  
  
 celecoxib 200 mg capsule Commonly known as:  CELEBREX Take  by mouth two (2) times a day. cholecalciferol 1,000 unit Cap Commonly known as:  VITAMIN D3 Take  by mouth daily. dicyclomine 10 mg capsule Commonly known as:  BENTYL Take 10 mg by mouth daily. escitalopram oxalate 20 mg tablet Commonly known as:  Crow Orts Take 1.5 tablets by mouth daily  
  
 fexofenadine 180 mg tablet Commonly known as:  Varma Pleasant Take  by mouth. hydroCHLOROthiazide 25 mg tablet Commonly known as:  HYDRODIURIL  
  
 imipramine 25 mg tablet Commonly known as:  TOFRANIL Take 25 mg by mouth three (3) times daily. metoprolol tartrate 25 mg tablet Commonly known as:  LOPRESSOR  
  
 NAMZARIC 28-10 mg Cspx Generic drug:  memantine-donepezil TAKE 1 CAPSULE DAILY  
  
 risperiDONE 0.5 mg tablet Commonly known as:  RisperDAL Take 1 Tab by mouth nightly. rivaroxaban 20 mg Tab tablet Commonly known as:  Timmy Brooms Take  by mouth daily. simvastatin 20 mg tablet Commonly known as:  ZOCOR SITagliptin-metFORMIN 50-1,000 mg per tablet Commonly known as:  Christain Diesel Take 1 Tab by mouth two (2) times daily (with meals). Prescriptions Printed Refills  
 carbidopa-levodopa (SINEMET)  mg per tablet 3 Si po at 8am, 12 noon and 4pm  
 Class: Print Follow-up Instructions Return for May 11. Patient Instructions PRESCRIPTION REFILL POLICY [facility] Statement to Patients April 1, 2014 In an effort to ensure the large volume of patient prescription refills is processed in the most efficient and expeditious manner, we are asking our patients to assist us by calling your Pharmacy for all prescription refills, this will include also your  Mail Order Pharmacy. The pharmacy will contact our office electronically to continue the refill process. Please do not wait until the last minute to call your pharmacy. We need at least 48 hours (2days) to fill prescriptions. We also encourage you to call your pharmacy before going to  your prescription to make sure it is ready. With regard to controlled substance prescription refill requests (narcotic refills) that need to be picked up at our office, we ask your cooperation by providing us with at least 72 hours (3days) notice that you will need a refill. We will not refill narcotic prescription refill requests after 4:00pm on any weekday, Monday through Thursday, or after 2:00pm on Fridays, or on the weekends. We encourage everyone to explore another way of getting your prescription refill request processed using GoodApril, our patient web portal through our electronic medical record system. GoodApril is an efficient and effective way to communicate your medication request directly to the office and  downloadable as an francisco on your smart phone . GoodApril also features a review functionality that allows you to view your medication list as well as leave messages for your physician. Are you ready to get connected? If so please review the attatched instructions or speak to any of our staff to get you set up right away! Thank you so much for your cooperation. Should you have any questions please contact our Practice Administrator. The Physicians and Staff,  [facility] Start taking Sinemet 25/101 tablet at 8 AM, 12 noon, and 4 PM 
 
Follow-up with our nurse practitioner on May 11 when you see Dr. Porsche Tang Introducing [facility]! [facility] introduces [employer] patient portal. Now you can access parts of your medical record, email your doctor's office, and request medication refills online. 1. In your internet browser, go to [email or web address] 2. Click on the First Time User? Click Here link in the Sign In box. You will see the New Member Sign Up page. 3. Enter your [employer]rong Access Code exactly as it appears below. You will not need to use this code after youve completed the sign-up process. If you do not sign up before the expiration date, you must request a new code. · [employer]rong Access Code: [email or web address] Expires: 5/22/2018  3:38 PM 
 
4. Enter the last four digits of your Social Security Number (xxxx) and Date of Birth (mm/dd/yyyy) as indicated and click Submit. You will be taken to the next sign-up page. 5. Create a Cardiostrong ID. This will be your [employer]ostrong login ID and cannot be changed, so think of one that is secure and easy to remember. 6. Create a [employer]ostrong password. You can change your password at any time. 7. Enter your Password Reset Question and Answer. This can be used at a later time if you forget your password. 8. Enter your e-mail address. You will receive e-mail notification when new information is available in [address]. 9. Click Sign Up. You can now view and download portions of your medical record. 10. Click the Download Summary menu link to download a portable copy of your medical information. If you have questions, please visit the Frequently Asked Questions section of the [employer]ostrong website. Remember, [employer]rong is NOT to be used for urgent needs. For medical emergencies, dial 911. Now available from your iPhone and Android! Please provide this summary of care documentation to your next provider. Your primary care clinician is listed as Ene Arreguin. If you have any questions after today's visit, please call [phone].

## 2018-04-30 NOTE — PROGRESS NOTES
Patient is here with his wife for memory loss. Wife states memory may be a little worse. They also want to go over MRI, EEG and neuropsych results.

## 2018-04-30 NOTE — PROGRESS NOTES
[address] [facility]. [address]   [place] [address]   [address]   [phone] [facility]   [phone] Fax               Chief Complaint   Patient presents with    Follow-up     memory loss     Current Outpatient Prescriptions   Medication Sig Dispense Refill    celecoxib (CELEBREX) 200 mg capsule Take  by mouth two (2) times a day.  risperiDONE (RISPERDAL) 0.5 mg tablet Take 1 Tab by mouth nightly. 90 Tab 1    escitalopram oxalate (LEXAPRO) 20 mg tablet Take 1.5 tablets by mouth daily 135 Tab 1    dicyclomine (BENTYL) 10 mg capsule Take 10 mg by mouth daily.  NAMZARIC 28-10 mg CSpX TAKE 1 CAPSULE DAILY 90 Cap 3    allopurinol (ZYLOPRIM) 300 mg tablet   0    hydrochlorothiazide (HYDRODIURIL) 25 mg tablet   0    metoprolol (LOPRESSOR) 25 mg tablet   0    simvastatin (ZOCOR) 20 mg tablet   0    fexofenadine (ALLEGRA) 180 mg tablet Take  by mouth.  imipramine (TOFRANIL) 25 mg tablet Take 25 mg by mouth three (3) times daily.  sitaGLIPtin-metFORMIN (JANUMET) 50-1,000 mg per tablet Take 1 Tab by mouth two (2) times daily (with meals).  rivaroxaban (XARELTO) 20 mg tab tablet Take  by mouth daily.  Cholecalciferol, Vitamin D3, 1,000 unit cap Take  by mouth daily.  cephALEXin (KEFLEX) 500 mg capsule Take 1 Cap by mouth two (2) times a day. 10 Cap 0    glipiZIDE SR (GLUCOTROL) 5 mg CR tablet   0      Allergies   Allergen Reactions    Atenolol Unknown (comments)    Dilaudid [Hydromorphone] Unknown (comments)    Lisinopril Unknown (comments)    Oxycodone Unknown (comments)     Social History   Substance Use Topics    Smoking status: Former Smoker    Smokeless tobacco: Former User     Quit date: 1968    Alcohol use No   Patient and his wife return today for follow-up cognitive complaints. He saw Alyce Hagan our nurse practitioner in the interim between his last visit with me. He is on Namzaric.   He had MRI of the brain performed demonstrating small vessel white matter changes and cerebral atrophy. He has also had a CT scan of the head performed for complaints of dizziness showing no acute change. The scans are personally reviewed. Carotid Doppler with less than 50% stenosis bilaterally. EEG performed without abnormality. B12 and thyroid function unrevealing. Formal neuropsychological evaluation per Dr. Jb Fernandez demonstrated: \"This patient again generated an abnormal range Neuropsychological Evaluation with respect to neurocognitive functioning. In this regard, he is showing problems with mental status, verbal fluency, sustained attention, auditory learning, auditory memory, bilateral motor skills, and executive functioning. Casual language skills will serve to mask underlying cognitive deficits at times. Emotionally, the patient reported moderate depression and moderate anxiety.                             In my opinion, this profile is consistent with an evolving organic process that has declined to the mild to moderate range. Mood issues have worsened as well (previously mild and now moderate), but that would not be the sole basis for the decline we are seeing here. It is an exacerbating factor here. I suggest a review of his current medication management for memory. Consider treatment for attention as well as psychiatric treatment for moderate depression and anxiety. The patient should be encouraged to remain as mentally, socially, and physically active as possible.                             The patient's generated cognitive difficulties are significant to the degree whereby it is my opinion that he should not live independently without appropriate supervision for those domains with a heavy memory emphasis. This includes medication management supervision and supervision of financial dealings.  Marked problems with attention and reaction time raise concern regarding driving safety, and I suggest consideration for a formal evaluation of same. I do not believe that he is competent to manage his own affairs at this time. We now have baseline and updated data on him. Follow up prn. Clinical correlation is, of course, indicated. \"    His 2016 neuropsychological evaluation demonstrated mild dementia. This neuropsychological profile demonstrated decline. We discussed his test results. Discussed cognitive decline. Discussed that he needs to do something to keep himself cognitively active every day. Discussed keeping physically active. He has gotten physical therapy but they are holding off until he gets a new power chair. Discussed assisted living. Discussed the fact that he needs assistance with all of his ADLs. He is incontinent now. Needs assistance with feeding. Needs assistance with dressing. He wants to come home but his wife is unable to care for him. We discussed the Alzheimer's program to look for resources as well. His wife is concerned that he may have Parkinson's. She discusses tremor when he eats. 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 Discussed that I really do not think that he has Parkinson's. She has several questions today in that regard as well as medications, decline, resources etc.    Examination  Visit Vitals    /90    Ht 6' (1.829 m)    Wt 117.5 kg (259 lb)    BMI 35.13 kg/m2     He is awake and alert. He is conversant. He has no tremor today. He does have cogwheeling. He is a practic. He has a lot of difficulty but is able to stand with multiple attempts. Has some festination when he tries to ambulate and only takes 1 step    Impression/Plan  Advancing dementia as noted. Continue with Namzaric. Answered multiple questions today. Discussed strategies to try to keep him active. Discussed getting Alzheimer's program to come in and see what resources are available. I think that he probably needs more care and is getting right now.   Discussed that I do not think he has Parkinson's we will give him a trial of Sinemet. He will return on May 11 to discuss his test results Dr. Hector Leong. We will have him see 1 of our nurse practitioners to see if the Sinemet may need difference. Total time: 30 min   Counseling / coordination time: 20 min   > 50% counseling / coordination?: Yes re: as documented      Larry Mooney MD    This note was created using voice recognition software. Despite editing, there may be syntax errors. This note will not be viewable in [address]

## 2018-04-30 NOTE — PATIENT INSTRUCTIONS
[facility]   Statement to Patients  April 1, 2014      In an effort to ensure the large volume of patient prescription refills is processed in the most efficient and expeditious manner, we are asking our patients to assist us by calling your Pharmacy for all prescription refills, this will include also your  Mail Order Pharmacy. The pharmacy will contact our office electronically to continue the refill process. Please do not wait until the last minute to call your pharmacy. We need at least 48 hours (2days) to fill prescriptions. We also encourage you to call your pharmacy before going to  your prescription to make sure it is ready. With regard to controlled substance prescription refill requests (narcotic refills) that need to be picked up at our office, we ask your cooperation by providing us with at least 72 hours (3days) notice that you will need a refill. We will not refill narcotic prescription refill requests after 4:00pm on any weekday, Monday through Thursday, or after 2:00pm on Fridays, or on the weekends. We encourage everyone to explore another way of getting your prescription refill request processed using [employer], our patient web portal through our electronic medical record system. [employer] is an efficient and effective way to communicate your medication request directly to the office and  downloadable as an francisco on your smart phone . [employer] also features a review functionality that allows you to view your medication list as well as leave messages for your physician. Are you ready to get connected? If so please review the attatched instructions or speak to any of our staff to get you set up right away! Thank you so much for your cooperation. Should you have any questions please contact our Practice Administrator.     The Physicians and Staff,  [facility]     Start taking Sinemet 25/101 tablet at 8 AM, 12 noon, and 4 PM    Follow-up with our nurse practitioner on May 11 when you see Dr. Lisa Escobedo

## 2018-05-11 ENCOUNTER — OFFICE VISIT (OUTPATIENT)
Dept: NEUROLOGY | Age: 78
End: 2018-05-11

## 2018-05-11 VITALS
DIASTOLIC BLOOD PRESSURE: 78 MMHG | WEIGHT: 259 LBS | BODY MASS INDEX: 35.08 KG/M2 | SYSTOLIC BLOOD PRESSURE: 120 MMHG | HEIGHT: 72 IN

## 2018-05-11 DIAGNOSIS — G30.1 LATE ONSET ALZHEIMER'S DISEASE WITHOUT BEHAVIORAL DISTURBANCE (HCC): Primary | ICD-10-CM

## 2018-05-11 DIAGNOSIS — F02.818 LATE ONSET ALZHEIMER'S DISEASE WITH BEHAVIORAL DISTURBANCE (HCC): Primary | ICD-10-CM

## 2018-05-11 DIAGNOSIS — F33.1 DEPRESSION, MAJOR, RECURRENT, MODERATE (HCC): ICD-10-CM

## 2018-05-11 DIAGNOSIS — G30.1 LATE ONSET ALZHEIMER'S DISEASE WITH BEHAVIORAL DISTURBANCE (HCC): Primary | ICD-10-CM

## 2018-05-11 DIAGNOSIS — F41.1 GENERALIZED ANXIETY DISORDER: ICD-10-CM

## 2018-05-11 DIAGNOSIS — F02.80 LATE ONSET ALZHEIMER'S DISEASE WITHOUT BEHAVIORAL DISTURBANCE (HCC): Primary | ICD-10-CM

## 2018-05-11 RX ORDER — CARBIDOPA AND LEVODOPA 25; 100 MG/1; MG/1
TABLET ORAL
Qty: 135 TAB | Refills: 5 | Status: SHIPPED | OUTPATIENT
Start: 2018-05-11

## 2018-05-11 NOTE — LETTER
5/11/2018 11:07 AM 
 
Mr. Angel Magana [address] [facility] [address] [id number] To Whom It May Concern,  
 
Mr. Elba Gaines is currently under the care of [facility] with a new diagnosis of Parkinsonism. If you have any further questions please contact our office. Sincerely, Flaco Gottlieb

## 2018-05-11 NOTE — PATIENT INSTRUCTIONS
[facility]   Statement to Patients  April 1, 2014      In an effort to ensure the large volume of patient prescription refills is processed in the most efficient and expeditious manner, we are asking our patients to assist us by calling your Pharmacy for all prescription refills, this will include also your  Mail Order Pharmacy. The pharmacy will contact our office electronically to continue the refill process. Please do not wait until the last minute to call your pharmacy. We need at least 48 hours (2days) to fill prescriptions. We also encourage you to call your pharmacy before going to  your prescription to make sure it is ready. With regard to controlled substance prescription refill requests (narcotic refills) that need to be picked up at our office, we ask your cooperation by providing us with at least 72 hours (3days) notice that you will need a refill. We will not refill narcotic prescription refill requests after 4:00pm on any weekday, Monday through Thursday, or after 2:00pm on Fridays, or on the weekends. We encourage everyone to explore another way of getting your prescription refill request processed using [employer], our patient web portal through our electronic medical record system. [employer] is an efficient and effective way to communicate your medication request directly to the office and  downloadable as an francisco on your smart phone . [employer] also features a review functionality that allows you to view your medication list as well as leave messages for your physician. Are you ready to get connected? If so please review the attatched instructions or speak to any of our staff to get you set up right away! Thank you so much for your cooperation. Should you have any questions please contact our Practice Administrator.     The Physicians and Staff,  [facility]

## 2018-05-11 NOTE — PROGRESS NOTES
Office feedback session with the patient and spouse today. I reviewed the results of the recent Neuropsychological Evaluation, including discussing individual tests as well as patient's areas of neurocognitive strength versus weakness. Education was provided regarding my diagnostic impressions, and we discussed treatment plan/options. I also answered numerous questions related to the clinical findings, including discussing various methods to improve cognition and mood. Counseling provided regarding mood and cognition. We talked about dementia and mild decline in memory but marked decline in mood. Need to see neuropsychology and psychiatry. Consider medication for anxiety/depression issues. He is in assisted living now, since end of [employer]. CBT and supportive psychotherapy techniques were utilized. The patient will follow up with the referring provider, and reported being very pleased with the services provided. [facility] 20 minutes with patient and spouse, record review, coordination of care. Records provided. We discussed: This patient again generated an abnormal range Neuropsychological Evaluation with respect to neurocognitive functioning. In this regard, he is showing problems with mental status, verbal fluency, sustained attention, auditory learning, auditory memory, bilateral motor skills, and executive functioning. Casual language skills will serve to mask underlying cognitive deficits at times. Emotionally, the patient reported moderate depression and moderate anxiety.                             In my opinion, this profile is consistent with an evolving organic process that has declined to the mild to moderate range. Mood issues have worsened as well (previously mild and now moderate), but that would not be the sole basis for the decline we are seeing here. It is an exacerbating factor here.   I suggest a review of his current medication management for memory. Consider treatment for attention as well as psychiatric treatment for moderate depression and anxiety. The patient should be encouraged to remain as mentally, socially, and physically active as possible.                             The patient's generated cognitive difficulties are significant to the degree whereby it is my opinion that he should not live independently without appropriate supervision for those domains with a heavy memory emphasis. This includes medication management supervision and supervision of financial dealings. Marked problems with attention and reaction time raise concern regarding driving safety, and I suggest consideration for a formal evaluation of same. I do not believe that he is competent to manage his own affairs at this time. We now have baseline and updated data on him. Follow up prn. Clinical correlation is, of course, indicated.                           I will discuss these findings with the patient and family when they follow up with me in the near future.   A follow up Neuropsychological Evaluation is indicated on a prn basis.       DIAGNOSES:           Dementia -                Mild To Moderate (progressing)                                                                 Depression - Moderate (progressing over time)                                              Anxiety - Moderate, (Worse over time)

## 2018-05-11 NOTE — MR AVS SNAPSHOT
[facility] 
 
 
 [place] [address] [address] [phone] Patient: Junior Rodriges MRN: [id number] :1940 Visit Information Date & Time Provider Department Dept. Phone Encounter #  
 2018 11:00 AM JOELLE Dykes Neurology [facility] [phone] [id number] Follow-up Instructions Return in about 2 months (around 2018). Your Appointments 2018  1:40 PM  
Follow Up with Jeff Lopez MD  
[facility] Appt Note: memory loss [address] [address] [phone]  
  
   
 [place] [address] [id number] [address] Upcoming Health Maintenance Date Due DTaP/Tdap/Td series (1 - Tdap) 10/9/1960 ZOSTER VACCINE AGE 60> 1999 GLAUCOMA SCREENING Q2Y 10/9/2004 Pneumococcal 65+ Low/Medium Risk (1 of 2 - PCV13) 10/9/2004 MEDICARE YEARLY EXAM 3/14/2018 Influenza Age 5 to Adult 2018 Allergies as of 2018  Review Complete On: 2018 By: Nury Vianey Severity Noted Reaction Type Reactions Atenolol  2016    Unknown (comments) Dilaudid [Hydromorphone]  2016    Unknown (comments) Lisinopril  2016    Unknown (comments) Oxycodone  2016    Unknown (comments) Current Immunizations  Never Reviewed No immunizations on file. Not reviewed this visit You Were Diagnosed With   
  
 Codes Comments Late onset Alzheimer's disease without behavioral disturbance    -  Primary ICD-10-CM: G30.1, F02.80 ICD-9-CM: 331.0, 294.10 Vitals BP Height(growth percentile) Weight(growth percentile) BMI Smoking Status 120/78 6' (1.829 m) 259 lb (117.5 kg) 35.13 kg/m2 Former Smoker BMI and BSA Data  Body Mass Index Body Surface Area  
 35.13 kg/m 2 2.44 m 2  
  
  
 Preferred Pharmacy Pharmacy Name Phone [facility] [address] [phone] Your Updated Medication List  
  
   
This list is accurate as of 5/11/18 11:04 AM.  Always use your most recent med list.  
  
  
  
  
 allopurinol 300 mg tablet Commonly known as:  ZYLOPRIM  
  
 carbidopa-levodopa  mg per tablet Commonly known as:  SINEMET  
1.5 tablets po at 8am, 12 noon and 4pm  
  
 celecoxib 200 mg capsule Commonly known as:  CELEBREX Take  by mouth two (2) times a day. cholecalciferol 1,000 unit Cap Commonly known as:  VITAMIN D3 Take  by mouth daily. dicyclomine 10 mg capsule Commonly known as:  BENTYL Take 10 mg by mouth daily. escitalopram oxalate 20 mg tablet Commonly known as:  Alonzo Rash Take 1.5 tablets by mouth daily  
  
 fexofenadine 180 mg tablet Commonly known as:  Zamzam Puja Take  by mouth. hydroCHLOROthiazide 25 mg tablet Commonly known as:  HYDRODIURIL  
  
 imipramine 25 mg tablet Commonly known as:  TOFRANIL Take 25 mg by mouth three (3) times daily. metoprolol tartrate 25 mg tablet Commonly known as:  LOPRESSOR  
  
 NAMZARIC 28-10 mg Cspx Generic drug:  memantine-donepezil TAKE 1 CAPSULE DAILY  
  
 OTHER Increase Sinemet to 1.5 tablets at 8 am, 12 noon, and 4 pm  
  
 risperiDONE 0.5 mg tablet Commonly known as:  RisperDAL Take 1 Tab by mouth nightly. rivaroxaban 20 mg Tab tablet Commonly known as:  Chantale Benes Take  by mouth daily. simvastatin 20 mg tablet Commonly known as:  ZOCOR SITagliptin-metFORMIN 50-1,000 mg per tablet Commonly known as:  Rhodes Yimi Take 1 Tab by mouth two (2) times daily (with meals). Prescriptions Printed Refills OTHER 0 Sig: Increase Sinemet to 1.5 tablets at 8 am, 12 noon, and 4 pm  
 Class: Print Prescriptions Sent to Pharmacy  Refills  
 carbidopa-levodopa (SINEMET)  mg per tablet 5  
 Si.5 tablets po at 8am, 12 noon and 4pm  
 Class: Normal  
 Pharmacy: [facility] [address]  #: [phone] Follow-up Instructions Return in about 2 months (around 2018). Patient Instructions PRESCRIPTION REFILL POLICY [facility] Statement to Patients 2014 In an effort to ensure the large volume of patient prescription refills is processed in the most efficient and expeditious manner, we are asking our patients to assist us by calling your Pharmacy for all prescription refills, this will include also your  Mail Order Pharmacy. The pharmacy will contact our office electronically to continue the refill process. Please do not wait until the last minute to call your pharmacy. We need at least 48 hours (2days) to fill prescriptions. We also encourage you to call your pharmacy before going to  your prescription to make sure it is ready. With regard to controlled substance prescription refill requests (narcotic refills) that need to be picked up at our office, we ask your cooperation by providing us with at least 72 hours (3days) notice that you will need a refill. We will not refill narcotic prescription refill requests after 4:00pm on any , Monday through Thursday, or after 2:00pm on , or on the weekends. We encourage everyone to explore another way of getting your prescription refill request processed using [employer], our patient web portal through our electronic medical record system. [employer] is an efficient and effective way to communicate your medication request directly to the office and  downloadable as an francisco on your smart phone . [employer] also features a review functionality that allows you to view your medication list as well as leave messages for your physician. Are you ready to get connected?  If so please review the attatched instructions or speak to any of our staff to get you set up right away! Thank you so much for your cooperation. Should you have any questions please contact our Practice Administrator. The Physicians and Staff,  [facility] Introducing \[facility]\"" SERVICES! Alicia Milinds introduces [employer] patient portal. Now you can access parts of your medical record, email your doctor's office, and request medication refills online. 1. In your internet browser, go to [email or web address] 2. Click on the First Time User? Click Here link in the Sign In box. You will see the New Member Sign Up page. 3. Enter your [employer] Access Code exactly as it appears below. You will not need to use this code after youve completed the sign-up process. If you do not sign up before the expiration date, you must request a new code. · [employer] Access Code: [email or web address] Expires: 5/22/2018  3:38 PM 
 
4. Enter the last four digits of your Social Security Number (xxxx) and Date of Birth (mm/dd/yyyy) as indicated and click Submit. You will be taken to the next sign-up page. 5. Create a [employer] ID. This will be your [employer] login ID and cannot be changed, so think of one that is secure and easy to remember. 6. Create a [employer] password. You can change your password at any time. 7. Enter your Password Reset Question and Answer. This can be used at a later time if you forget your password. 8. Enter your e-mail address. You will receive e-mail notification when new information is available in [address] 9. Click Sign Up. You can now view and download portions of your medical record. 10. Click the Download Summary menu link to download a portable copy of your medical information. If you have questions, please visit the Frequently Asked Questions section of the [employer] website. Remember, [employer] is NOT to be used for urgent needs. For medical emergencies, dial 911. Now available from your iPhone and Android! Please provide this summary of care documentation to your next provider. Your primary care clinician is listed as Eric Marley. If you have any questions after today's visit, please call [phone].

## 2018-05-11 NOTE — PROGRESS NOTES
Carolin Marie is a 65 y.o. male who presents with the following  Chief Complaint   Patient presents with    Follow-up       HPI Patient comes in for a follow up for neuropsych testing results and sinemet trial. He has no other questions about his memory testing. Living at [facility] and states he is trying to get out and be active but likes to take his time alone. Does watch tv, work on computer, gets to [facility]. Trying to stay busy. On Namzaric. Good and bad days. He had a trial on Sinemet  TID last visit for gait abnormality, stiffness, rigidity, and resting tremor. This medication addition has really helped him per his report and the staff report. They all feel like he is looser, moving better and has less resting tremor and intention tremor. Overall he has seen good changes. Tremor does get worse with anxiety. He is to restart up PT again when he gets his new wheelchair. Otherwise no other concerns. Allergies   Allergen Reactions    Atenolol Unknown (comments)    Dilaudid [Hydromorphone] Unknown (comments)    Lisinopril Unknown (comments)    Oxycodone Unknown (comments)       Current Outpatient Prescriptions   Medication Sig    carbidopa-levodopa (SINEMET)  mg per tablet 1.5 tablets po at 8am, 12 noon and 4pm    OTHER Increase Sinemet to 1.5 tablets at 8 am, 12 noon, and 4 pm    celecoxib (CELEBREX) 200 mg capsule Take  by mouth two (2) times a day.  risperiDONE (RISPERDAL) 0.5 mg tablet Take 1 Tab by mouth nightly.  escitalopram oxalate (LEXAPRO) 20 mg tablet Take 1.5 tablets by mouth daily    dicyclomine (BENTYL) 10 mg capsule Take 10 mg by mouth daily.  NAMZARIC 28-10 mg CSpX TAKE 1 CAPSULE DAILY    allopurinol (ZYLOPRIM) 300 mg tablet     hydrochlorothiazide (HYDRODIURIL) 25 mg tablet     metoprolol (LOPRESSOR) 25 mg tablet     simvastatin (ZOCOR) 20 mg tablet     fexofenadine (ALLEGRA) 180 mg tablet Take  by mouth.     imipramine (TOFRANIL) 25 mg tablet Take 25 mg by mouth three (3) times daily.  sitaGLIPtin-metFORMIN (JANUMET) 50-1,000 mg per tablet Take 1 Tab by mouth two (2) times daily (with meals).  rivaroxaban (XARELTO) 20 mg tab tablet Take  by mouth daily.  Cholecalciferol, Vitamin D3, 1,000 unit cap Take  by mouth daily. No current facility-administered medications for this visit. History   Smoking Status    Former Smoker   Smokeless Tobacco    Former User    Quit date: 1968       Past Medical History:   Diagnosis Date    Anxiety     Arthritis     Diabetes ([address]     Falls     Fatigue     Hearing loss     History of knee replacement     Hypertension     Muscle weakness     Ringing in ears     Skipped beats     Stroke [facility])        Past Surgical History:   Procedure Laterality Date    COLONOSCOPY N/A 8/15/2017    COLONOSCOPY performed by Elba Gooden MD at [facility] HX KNEE REPLACEMENT  2016    R knee replacement    HX ORTHOPAEDIC      left knee replacement    HX PROSTATECTOMY         History reviewed. No pertinent family history. Social History     Social History    Marital status:      Spouse name: N/A    Number of children: N/A    Years of education: N/A     Social History Main Topics    Smoking status: Former Smoker    Smokeless tobacco: Former User     Quit date: 1968    Alcohol use No    Drug use: No    Sexual activity: Not Asked     Other Topics Concern    None     Social History Narrative       Review of Systems   Constitutional: Positive for malaise/fatigue. Eyes: Negative for blurred vision, double vision and photophobia. Respiratory: Negative for shortness of breath and wheezing. Gastrointestinal: Negative for nausea and vomiting. Musculoskeletal: Negative for falls. Neurological: Positive for tremors and weakness. Negative for dizziness and headaches. Psychiatric/Behavioral: Positive for memory loss. Negative for hallucinations. The patient is nervous/anxious. The patient does not have insomnia. Remainder of comprehensive review is negative. Physical Exam :    Visit Vitals    /78    Ht 6' (1.829 m)    Wt 117.5 kg (259 lb)    BMI 35.13 kg/m2       General: Well defined, nourished, and groomed individual in no acute distress.    Psych: Good mood and bright affect    NEUROLOGICAL EXAMINATION:    Mental Status: Alert and oriented to person, place, and time    Cranial Nerves:    II, III, IV, VI: Visual acuity grossly intact. Visual fields are normal.    Pupils are equal, round, and reactive to light and accommodation.    Extra-ocular movements are full and fluid. Fundoscopic exam was benign, no ptosis or nystagmus.    V-XII: Hearing is grossly intact. Facial features are symmetric, with normal sensation and strength. The palate rises symmetrically and the tongue protrudes midline. Sternocleidomastoids 5/5. Motor Examination: Normal tone, bulk, and strength, 4/5 muscle strength throughout. Coordination: Finger to nose was normal. No resting or intention tremor    Gait and Station: in wheelchair, not assessed. Reflexes: DTRs 2+ throughout. Results for orders placed or performed during the hospital encounter of 04/20/18   URINE CULTURE HOLD SAMPLE   Result Value Ref Range    Urine culture hold        URINE ON HOLD IN MICROBIOLOGY DEPT FOR 3 DAYS. IF UNPRESERVED URINE IS SUBMITTED, IT CANNOT BE USED FOR ADDITIONAL TESTING AFTER 24 HRS, RECOLLECTION WILL BE REQUIRED.    CULTURE, URINE   Result Value Ref Range    Special Requests: NO SPECIAL REQUESTS      Leary Count <1,000 CFU/ML      Culture result: NO GROWTH 1 DAY     CBC WITH AUTOMATED DIFF   Result Value Ref Range    WBC 11.9 (H) 4.1 - 11.1 K/uL    RBC 4.03 (L) 4.10 - 5.70 M/uL    HGB 12.2 12.1 - 17.0 g/dL    HCT 39.3 36.6 - 50.3 %    MCV 97.5 80.0 - 99.0 FL    MCH 30.3 26.0 - 34.0 PG    MCHC 31.0 30.0 - 36.5 g/dL    RDW 13.2 11.5 - 14.5 %    PLATELET 029 470 - 852 K/uL    MPV 10.4 8.9 - 12.9 FL    NRBC 0.0 0  WBC    ABSOLUTE NRBC 0.00 0.00 - 0.01 K/uL    NEUTROPHILS 79 (H) 32 - 75 %    LYMPHOCYTES 12 12 - 49 %    MONOCYTES 7 5 - 13 %    EOSINOPHILS 1 0 - 7 %    BASOPHILS 1 0 - 1 %    IMMATURE GRANULOCYTES 1 (H) 0.0 - 0.5 %    ABS. NEUTROPHILS 9.4 (H) 1.8 - 8.0 K/UL    ABS. LYMPHOCYTES 1.4 0.8 - 3.5 K/UL    ABS. MONOCYTES 0.8 0.0 - 1.0 K/UL    ABS. EOSINOPHILS 0.2 0.0 - 0.4 K/UL    ABS. BASOPHILS 0.1 0.0 - 0.1 K/UL    ABS. IMM. GRANS. 0.1 (H) 0.00 - 0.04 K/UL    DF AUTOMATED     METABOLIC PANEL, COMPREHENSIVE   Result Value Ref Range    Sodium 141 136 - 145 mmol/L    Potassium 4.5 3.5 - 5.1 mmol/L    Chloride 103 97 - 108 mmol/L    CO2 30 21 - 32 mmol/L    Anion gap 8 5 - 15 mmol/L    Glucose 146 (H) 65 - 100 mg/dL    BUN 28 (H) 6 - 20 MG/DL    Creatinine 1.31 (H) 0.70 - 1.30 MG/DL    BUN/Creatinine ratio 21 (H) 12 - 20      GFR est AA >60 >60 ml/min/1.73m2    GFR est non-AA 53 (L) >60 ml/min/1.73m2    Calcium 8.8 8.5 - 10.1 MG/DL    Bilirubin, total 0.2 0.2 - 1.0 MG/DL    ALT (SGPT) 21 12 - 78 U/L    AST (SGOT) 16 15 - 37 U/L    Alk.  phosphatase 72 45 - 117 U/L    Protein, total 7.3 6.4 - 8.2 g/dL    Albumin 3.2 (L) 3.5 - 5.0 g/dL    Globulin 4.1 (H) 2.0 - 4.0 g/dL    A-G Ratio 0.8 (L) 1.1 - 2.2     URINALYSIS W/MICROSCOPIC   Result Value Ref Range    Color YELLOW      Appearance CLEAR CLEAR      Specific gravity 1.019 1.003 - 1.030      pH (UA) 6.5 5.0 - 8.0      Protein >300 (A) NEG mg/dL    Glucose >1000 (A) NEG mg/dL    Ketone >80 (A) NEG mg/dL    Blood LARGE (A) NEG      Urobilinogen 1.0 0.2 - 1.0 EU/dL    Nitrites POSITIVE (A) NEG      Leukocyte Esterase LARGE (A) NEG      WBC 0-4 0 - 4 /hpf    RBC 0-5 0 - 5 /hpf    Epithelial cells FEW FEW /lpf    Bacteria NEGATIVE  NEG /hpf   MAGNESIUM   Result Value Ref Range    Magnesium 1.6 1.6 - 2.4 mg/dL   TROPONIN I   Result Value Ref Range    Troponin-I, Qt. <0.04 <0.05 ng/mL   BILIRUBIN, CONFIRM   Result Value Ref Range    Bilirubin UA, confirm NEGATIVE  NEG         Orders Placed This Encounter    carbidopa-levodopa (SINEMET)  mg per tablet     Si.5 tablets po at 8am, 12 noon and 4pm     Dispense:  135 Tab     Refill:  5    OTHER     Sig: Increase Sinemet to 1.5 tablets at 8 am, 12 noon, and 4 pm     Dispense:  1 Units     Refill:  0       1. Late onset Alzheimer's disease without behavioral disturbance        Follow-up Disposition:  Return in about 2 months (around 2018). AD. Keep Namzaric 28-10 for memory preservation and keep his memory active by doing abstract things like he is going. Get out more at [facility] as activities will help his memory better. PT will also help his movement. Sinemet has helped so we will increase to 1.5 tablets TID as he wants to try to see if he can get even better relief for his Parkinsonism. He will monitor his symptoms and states he already has a follow up with DR. Odell in the near future. Call with any changes or concerns.      Total time: 25 min   Counseling / coordination time: 20min   > 50% counseling / coordination?: Yes re: testing, parkinson, treatments, AD, progression           This note will not be viewable in [employer]

## 2018-07-06 ENCOUNTER — TELEPHONE (OUTPATIENT)
Dept: NEUROLOGY | Age: 78
End: 2018-07-06

## 2018-07-06 NOTE — TELEPHONE ENCOUNTER
----- Message from Giacomo Velez sent at 7/6/2018 10:56 AM EDT -----  Regarding: JOELLE Corral/Telephone  Pt's wife(Gayla Dempsey) would like a call back from \"Lora\" regarding the status of paperwork she left on Monday 07/02/18. Best contact number [phone].

## 2018-07-06 NOTE — TELEPHONE ENCOUNTER
Called and spoke to patient's wife, marya. Informed her paperwork was ready for  at [facility].

## 2018-10-04 ENCOUNTER — TELEPHONE (OUTPATIENT)
Dept: NEUROLOGY | Age: 78
End: 2018-10-04

## 2018-10-04 NOTE — TELEPHONE ENCOUNTER
Ysabel Mosley called wanting to talk with the nurse about getting a letter stating that the patient has parkinson's diease. Please call jenny at   [phone] for more details.

## 2018-10-04 NOTE — TELEPHONE ENCOUNTER
R/t call to patient's wife. The [facility] will not approve the last letter with the work Parkinsonism; they say that it must say Parkinson's Disease and when he was diagnosed.      She would like letter mailed to her. [address] [phone]

## 2023-11-09 NOTE — PROGRESS NOTES
PT DAILY TREATMENT NOTE - [facility] 2-15    Patient Name: Maegan Pizarro  Date:10/30/2017  : 1940  [x]  Patient  Verified  Payor: Naty Ruiz / Plan: [facility] MEDICARE PART A & B / Product Type: Medicare /    In time: 12:30 PM  Out time: 1:30 PM  Total Treatment Time (min): 60  Total Timed Codes (min): 60  1:1 Treatment Time ( [address] only): 40  Visit #: 16    Treatment Area:  Other abnormalities of gait and mobility [R26.89]    SUBJECTIVE  Pain Level (0-10 scale): 3  Any medication changes, allergies to medications, adverse drug reactions, diagnosis change, or new procedure performed?: [x] No    [] Yes (see summary sheet for update)  Subjective functional status/changes:   [] No changes reported  Pt is scheduled for surgery on the  to remove 2 melanomas    OBJECTIVE    50 min Therapeutic Exercise:  [x] See flow sheet :   Rationale: increase ROM and increase strength to improve the patients ability to sit, stand, transfer, ambulate, lift, carry, reach, complete ADLs    10 min Neuromuscular Re-education:  [x]  See flow sheet :   Rationale: improve coordination, improve balance and increase proprioception  to improve the patients ability to sit, stand, transfer, ambulate, lift, carry, reach, complete ADLs        With   [x] TE   [] TA   [x] neuro   [] other: Patient Education: [x] Review HEP    [] Progressed/Changed HEP based on:   [x] positioning   [x] body mechanics   [] transfers   [x] heat/ice application    [] other:      Other Objective/Functional Measures:   TUG 21 seconds  Pt unable to t/f sit to stand without UE assist    MMT:  Hip flexors: 4/5 B  Knee extensors: 4+/5 B  Knee flexors: R 4 L 4+   Ankle DF: R 4 L 3+  Ankle PF: R 4 + L 4    Pain Level (0-10 scale) post treatment: 0    ASSESSMENT/Changes in Function:     Patient will continue to benefit from skilled PT services to modify and progress therapeutic interventions, address functional mobility deficits, address ROM deficits, address strength deficits, analyze and address soft tissue restrictions, analyze and cue movement patterns, analyze and modify body mechanics/ergonomics and assess and modify postural abnormalities to attain remaining goals. []  See Plan of Care  [x]  See progress note/recertification  []  See Discharge Summary         Progress towards goals / Updated goals:  Pt has not met any short or long term goals.     PLAN  [x]  Upgrade activities as tolerated     [x]  Continue plan of care  [x]  Update interventions per flow sheet       []  Discharge due to:_  []  Other:_      Sheilda Alpers, PT , DPT   10/30/2017  12:40 PM Infliximab Counseling:  I discussed with the patient the risks of infliximab including but not limited to myelosuppression, immunosuppression, autoimmune hepatitis, demyelinating diseases, lymphoma, and serious infections.  The patient understands that monitoring is required including a PPD at baseline and must alert us or the primary physician if symptoms of infection or other concerning signs are noted.

## (undated) DEVICE — CATH IV AUTOGRD BC PNK 20GA 25 -- INSYTE

## (undated) DEVICE — 1200 GUARD II KIT W/5MM TUBE W/O VAC TUBE: Brand: GUARDIAN

## (undated) DEVICE — 3M™ CUROS™ DISINFECTING CAP FOR NEEDLELESS CONNECTORS 270/CARTON 20 CARTONS/CASE CFF1-270: Brand: CUROS™

## (undated) DEVICE — KENDALL RADIOLUCENT FOAM MONITORING ELECTRODE -RECTANGULAR SHAPE: Brand: KENDALL

## (undated) DEVICE — FORCEPS BX L240CM JAW DIA2.8MM L CAP W/ NDL MIC MESH TOOTH

## (undated) DEVICE — KIT COLON W/ 1.1OZ LUB AND 2 END

## (undated) DEVICE — CONTAINER SPEC 20 ML LID NEUT BUFF FORMALIN 10 % POLYPR STS

## (undated) DEVICE — CANN NASAL O2 CAPNOGRAPHY AD -- FILTERLINE

## (undated) DEVICE — ADULT SPO2 SENSOR: Brand: NELLCOR

## (undated) DEVICE — Device

## (undated) DEVICE — BAG BELONG PT PERS CLEAR HANDL

## (undated) DEVICE — TUBING ADMIN SET INTRAV ARTERI -- CONVERT TO ITEM 340436

## (undated) DEVICE — SYRINGE 50ML E/T

## (undated) DEVICE — BAG SPEC BIOHZRD 10 X 10 IN --

## (undated) DEVICE — BITEBLOCK ENDOSCP 60FR MAXI WHT POLYETH STURDY W/ VELC WVN

## (undated) DEVICE — SOLIDIFIER MEDC 1200ML -- CONVERT TO 356117